# Patient Record
Sex: FEMALE | Race: WHITE | NOT HISPANIC OR LATINO | Employment: FULL TIME
[De-identification: names, ages, dates, MRNs, and addresses within clinical notes are randomized per-mention and may not be internally consistent; named-entity substitution may affect disease eponyms.]

---

## 2017-01-11 ENCOUNTER — LAB SERVICES (OUTPATIENT)
Dept: OTHER | Age: 57
End: 2017-01-11

## 2017-01-11 ENCOUNTER — CHARTING TRANS (OUTPATIENT)
Dept: OTHER | Age: 57
End: 2017-01-11

## 2017-01-13 ENCOUNTER — CHARTING TRANS (OUTPATIENT)
Dept: OTHER | Age: 57
End: 2017-01-13

## 2017-01-13 LAB — PAP WITH HIGH RISK HPV: NORMAL

## 2017-01-26 ENCOUNTER — IMAGING SERVICES (OUTPATIENT)
Dept: OTHER | Age: 57
End: 2017-01-26

## 2017-01-26 ENCOUNTER — HOSPITAL (OUTPATIENT)
Dept: OTHER | Age: 57
End: 2017-01-26
Attending: OBSTETRICS & GYNECOLOGY

## 2017-02-06 ENCOUNTER — OFFICE VISIT (OUTPATIENT)
Dept: DERMATOLOGY CLINIC | Facility: CLINIC | Age: 57
End: 2017-02-06

## 2017-02-06 DIAGNOSIS — D23.5 BENIGN NEOPLASM OF SKIN OF TRUNK, EXCEPT SCROTUM: ICD-10-CM

## 2017-02-06 DIAGNOSIS — L82.1 SEBORRHEIC KERATOSES: ICD-10-CM

## 2017-02-06 DIAGNOSIS — L57.8 SUN-DAMAGED SKIN: ICD-10-CM

## 2017-02-06 DIAGNOSIS — D23.4 BENIGN NEOPLASM OF SCALP AND SKIN OF NECK: ICD-10-CM

## 2017-02-06 DIAGNOSIS — Z85.828 PERSONAL HISTORY OF OTHER MALIGNANT NEOPLASM OF SKIN: Primary | ICD-10-CM

## 2017-02-06 DIAGNOSIS — D23.70 BENIGN NEOPLASM OF SKIN OF LOWER LIMB, INCLUDING HIP, UNSPECIFIED LATERALITY: ICD-10-CM

## 2017-02-06 DIAGNOSIS — D23.30 BENIGN NEOPLASM OF SKIN OF FACE: ICD-10-CM

## 2017-02-06 DIAGNOSIS — L82.0 INFLAMED SEBORRHEIC KERATOSIS: ICD-10-CM

## 2017-02-06 DIAGNOSIS — D23.60 BENIGN NEOPLASM OF SKIN OF UPPER LIMB, INCLUDING SHOULDER, UNSPECIFIED LATERALITY: ICD-10-CM

## 2017-02-06 DIAGNOSIS — L81.4 SOLAR LENTIGO: ICD-10-CM

## 2017-02-06 PROCEDURE — 99213 OFFICE O/P EST LOW 20 MIN: CPT | Performed by: DERMATOLOGY

## 2017-02-06 PROCEDURE — 17110 DESTRUCTION B9 LES UP TO 14: CPT | Performed by: DERMATOLOGY

## 2017-02-06 NOTE — PROGRESS NOTES
Past Medical History   Diagnosis Date   • Basal cell cancer 2004     chest         Past Surgical History    OTHER SURGICAL HISTORY Right 2012    Comment ACL repair       Social History   Marital Status:   Spouse Name: N/A    Years of Education: N/A

## 2017-02-06 NOTE — PROGRESS NOTES
HPI:     Chief Complaint     Lesion        HPI     Lesion    Additional comments: Last visit to derm was 14 months prior. Pt presents today with concern of multiple lesions throughout body and requests a full body skin evaluation.  She does have a hx of BCC Years of Education: N/A  Number of Children: N/A     Occupational History  None on file     Social History Main Topics   Smoking status: Never Smoker     Smokeless tobacco: Not on file    Alcohol Use: Yes    Comment: social    Drug Use: Not on file    S slips–patient will stop all the various lip palms and just use petroleum jelly. If not enough will try 1% hydrocortisone. Solar lentigo-proper use and application of broad-spectrum sunblock SPF 30 or higher discussed.   Patient understands to put it on  1

## 2017-03-14 ENCOUNTER — MYAURORA ACCOUNT LINK (OUTPATIENT)
Dept: OTHER | Age: 57
End: 2017-03-14

## 2017-03-14 ENCOUNTER — PRIOR ORIGINAL RECORDS (OUTPATIENT)
Dept: OTHER | Age: 57
End: 2017-03-14

## 2017-03-15 ENCOUNTER — PRIOR ORIGINAL RECORDS (OUTPATIENT)
Dept: OTHER | Age: 57
End: 2017-03-15

## 2017-04-21 ENCOUNTER — MYAURORA ACCOUNT LINK (OUTPATIENT)
Dept: OTHER | Age: 57
End: 2017-04-21

## 2017-04-21 ENCOUNTER — PRIOR ORIGINAL RECORDS (OUTPATIENT)
Dept: OTHER | Age: 57
End: 2017-04-21

## 2017-04-24 ENCOUNTER — PRIOR ORIGINAL RECORDS (OUTPATIENT)
Dept: OTHER | Age: 57
End: 2017-04-24

## 2017-04-26 ENCOUNTER — PRIOR ORIGINAL RECORDS (OUTPATIENT)
Dept: OTHER | Age: 57
End: 2017-04-26

## 2017-05-03 LAB
ALBUMIN: 4.8 G/DL
ALKALINE PHOSPHATATE(ALK PHOS): 71 IU/L
BILIRUBIN TOTAL: 0.6 MG/DL
BUN: 19 MG/DL
CALCIUM: 9.3 MG/DL
CHLORIDE: 101 MEQ/L
CHOLESTEROL, TOTAL: 207 MG/DL
CREATININE, SERUM: 0.83 MG/DL
FREE T4: 1.15 MG/DL
GLUCOSE: 97 MG/DL
HDL CHOLESTEROL: 103 MG/DL
HEMATOCRIT: 41.7 %
HEMOGLOBIN: 13.8 G/DL
LDL CHOLESTEROL: 92 MG/DL
MAGNESIUM: 2.1 MG/DL
MCH: 31.3 PG
MCHC: 33.1 G/DL
MCV: 95 FL
PLATELETS: 194 K/UL
POTASSIUM, SERUM: 4.2 MEQ/L
PROTEIN, TOTAL: 6.6 G/DL
RED BLOOD COUNT: 4.41 X 10-6/U
SGOT (AST): 14 IU/L
SGPT (ALT): 15 IU/L
SODIUM: 144 MEQ/L
T4(THYROXINE): 4.6 MG/DL
THYROID STIMULATING HORMONE: 2.78 MLU/L
TOTAL CHOLESTEROL / HDL RATIO: 2 RATIO UN
TRIGLYCERIDES: 61 MG/DL
WHITE BLOOD COUNT: 4 X 10-3/U

## 2017-10-23 ENCOUNTER — HOSPITAL ENCOUNTER (OUTPATIENT)
Age: 57
Discharge: HOME | End: 2017-10-23
Attending: FAMILY MEDICINE
Payer: COMMERCIAL

## 2017-10-23 VITALS
HEART RATE: 61 BPM | WEIGHT: 155 LBS | RESPIRATION RATE: 18 BRPM | BODY MASS INDEX: 22.19 KG/M2 | OXYGEN SATURATION: 100 % | HEIGHT: 70 IN | SYSTOLIC BLOOD PRESSURE: 127 MMHG | TEMPERATURE: 98 F | DIASTOLIC BLOOD PRESSURE: 71 MMHG

## 2017-10-23 DIAGNOSIS — H69.83 DYSFUNCTION OF BOTH EUSTACHIAN TUBES: Primary | ICD-10-CM

## 2017-10-23 PROCEDURE — 99212 OFFICE O/P EST SF 10 MIN: CPT

## 2017-10-23 PROCEDURE — 99202 OFFICE O/P NEW SF 15 MIN: CPT

## 2017-10-23 NOTE — ED INITIAL ASSESSMENT (HPI)
PATIENT ARRIVED AMBULATORY TO ROOM C/O A \"CLOGGED FEELING\" TO BILATERAL EARS X1 WEEK. PATIENT STATES \"MY HEAD JUST FEELS CLOGGED\" PATIENT DENIES COUGH. DENIES NASAL CONGESTION. DENIES SINUS PRESSURE. DENIES FEVERS. DENIES N/V/D.  PT ALERT AND ORIENTED X

## 2017-10-23 NOTE — ED PROVIDER NOTES
Patient Seen in: [facility]    History   Patient presents with:  Ear Problem    Stated Complaint: Nakita Perdomo    HPI  Pt is a 63 yo who presents with clogged ears x 1 week. She has some post nasal drip. No fevers.  She place, and time. Skin: Skin is warm. Psychiatric: She has a normal mood and affect. Her behavior is normal.   Nursing note and vitals reviewed.         ED Course   Labs Reviewed - No data to display    ===================================================

## 2018-02-01 ENCOUNTER — CHARTING TRANS (OUTPATIENT)
Dept: OTHER | Age: 58
End: 2018-02-01

## 2018-02-06 ENCOUNTER — OFFICE VISIT (OUTPATIENT)
Dept: DERMATOLOGY CLINIC | Facility: CLINIC | Age: 58
End: 2018-02-06

## 2018-02-06 DIAGNOSIS — D48.5 NEOPLASM OF UNCERTAIN BEHAVIOR OF SKIN: Primary | ICD-10-CM

## 2018-02-06 DIAGNOSIS — D23.60 BENIGN NEOPLASM OF SKIN OF UPPER LIMB, INCLUDING SHOULDER, UNSPECIFIED LATERALITY: ICD-10-CM

## 2018-02-06 DIAGNOSIS — L81.4 SOLAR LENTIGO: ICD-10-CM

## 2018-02-06 DIAGNOSIS — Z85.828 PERSONAL HISTORY OF OTHER MALIGNANT NEOPLASM OF SKIN: ICD-10-CM

## 2018-02-06 DIAGNOSIS — D23.70 BENIGN NEOPLASM OF SKIN OF LOWER LIMB, INCLUDING HIP, UNSPECIFIED LATERALITY: ICD-10-CM

## 2018-02-06 DIAGNOSIS — L57.8 SUN-DAMAGED SKIN: ICD-10-CM

## 2018-02-06 DIAGNOSIS — L82.1 SEBORRHEIC KERATOSES: ICD-10-CM

## 2018-02-06 DIAGNOSIS — D23.30 BENIGN NEOPLASM OF SKIN OF FACE: ICD-10-CM

## 2018-02-06 DIAGNOSIS — D23.5 BENIGN NEOPLASM OF SKIN OF TRUNK, EXCEPT SCROTUM: ICD-10-CM

## 2018-02-06 DIAGNOSIS — D23.4 BENIGN NEOPLASM OF SCALP AND SKIN OF NECK: ICD-10-CM

## 2018-02-06 PROCEDURE — 88342 IMHCHEM/IMCYTCHM 1ST ANTB: CPT | Performed by: DERMATOLOGY

## 2018-02-06 PROCEDURE — 11101 BIOPSY, EACH ADDED LESION: CPT | Performed by: DERMATOLOGY

## 2018-02-06 PROCEDURE — 88305 TISSUE EXAM BY PATHOLOGIST: CPT | Performed by: DERMATOLOGY

## 2018-02-06 PROCEDURE — 11100 BIOPSY OF SKIN LESION: CPT | Performed by: DERMATOLOGY

## 2018-02-06 PROCEDURE — 88341 IMHCHEM/IMCYTCHM EA ADD ANTB: CPT | Performed by: DERMATOLOGY

## 2018-02-06 PROCEDURE — 99212 OFFICE O/P EST SF 10 MIN: CPT | Performed by: DERMATOLOGY

## 2018-02-06 PROCEDURE — 99213 OFFICE O/P EST LOW 20 MIN: CPT | Performed by: DERMATOLOGY

## 2018-02-06 NOTE — PROGRESS NOTES
Past Medical History:   Diagnosis Date   • Basal cell cancer 2004    chest     Past Surgical History:  2012: OTHER SURGICAL HISTORY Right      Comment: ACL repair    Social History  Social History   Marital status:   Spouse name: N/A    Years of ed

## 2018-02-06 NOTE — PROGRESS NOTES
HPI:     Chief Complaint     Skin Cancer        HPI     Skin Cancer    Additional comments: Last visit to derm was 1 yr prior. Pt presents today for f/u due to [facility], and is due for full body skin evaluation. Pt denies a family hx of skin cancer.         Yakelin Broussard No     Social History Narrative   None on file     Family History   Problem Relation Age of Onset   • Heart Disorder Sister    • arotic aneurysm [OTHER] Father        PHYSICAL EXAM:   Physical Exam  A complete body exam was performed, including face, neck, keratoses-reassurance–no treatment  Benign neoplasm of skin of trunk, except scrotum-ABCDE's of melanoma discussed. the patient is to follow up yearly. The patient will come sooner should they note  anything new or changing.   Proper sunblock use  of SPF 3

## 2018-05-01 ENCOUNTER — HOSPITAL ENCOUNTER (OUTPATIENT)
Age: 58
Discharge: HOME | End: 2018-05-01
Payer: COMMERCIAL

## 2018-05-01 VITALS
SYSTOLIC BLOOD PRESSURE: 133 MMHG | OXYGEN SATURATION: 98 % | DIASTOLIC BLOOD PRESSURE: 69 MMHG | BODY MASS INDEX: 21.19 KG/M2 | HEART RATE: 63 BPM | WEIGHT: 148 LBS | TEMPERATURE: 99 F | HEIGHT: 70 IN | RESPIRATION RATE: 18 BRPM

## 2018-05-01 DIAGNOSIS — J01.10 ACUTE NON-RECURRENT FRONTAL SINUSITIS: Primary | ICD-10-CM

## 2018-05-01 PROCEDURE — 99213 OFFICE O/P EST LOW 20 MIN: CPT

## 2018-05-01 PROCEDURE — 99214 OFFICE O/P EST MOD 30 MIN: CPT

## 2018-05-01 PROCEDURE — 87430 STREP A AG IA: CPT

## 2018-05-01 RX ORDER — CEFDINIR 300 MG/1
300 CAPSULE ORAL 2 TIMES DAILY
Qty: 20 CAPSULE | Refills: 0 | Status: SHIPPED | OUTPATIENT
Start: 2018-05-01 | End: 2018-05-11

## 2018-05-01 RX ORDER — BENZONATATE 100 MG/1
200 CAPSULE ORAL 3 TIMES DAILY PRN
Qty: 30 CAPSULE | Refills: 0 | Status: SHIPPED | OUTPATIENT
Start: 2018-05-01 | End: 2019-06-27 | Stop reason: ALTCHOICE

## 2018-05-01 NOTE — ED NOTES
Patient presents with cough, sore throat and sinus pressure and congestion going on 8 days. Low grade fever at home.

## 2018-05-01 NOTE — ED PROVIDER NOTES
Patient presents with:  Sore Throat  Fever (infectious)  Cough/URI      HPI:     Madonna Piedra is a 62year old female who presents with a chief complaint of frontal sinus congestion, thick purulent drainage coming from the nose, frontal sinus discomfort Findings:    /69   Pulse 63   Temp 98.5 °F (36.9 °C) (Oral)   Resp 18   Ht 177.8 cm (5' 10\")   Wt 67.1 kg   SpO2 98%   BMI 21.24 kg/m²   GENERAL: well developed, well nourished, well hydrated, no distress  SKIN: good skin turgor, no obvious rash a visit in 2 days

## 2018-05-01 NOTE — ED INITIAL ASSESSMENT (HPI)
Patient presents with c/o sore throat, cough, nasal congestion and drainage and intermittent fevers for 8 days.

## 2018-10-29 ENCOUNTER — IMAGING SERVICES (OUTPATIENT)
Dept: OTHER | Age: 58
End: 2018-10-29

## 2018-10-29 ENCOUNTER — HOSPITAL (OUTPATIENT)
Dept: OTHER | Age: 58
End: 2018-10-29
Attending: OBSTETRICS & GYNECOLOGY

## 2018-11-01 ENCOUNTER — CHARTING TRANS (OUTPATIENT)
Dept: OTHER | Age: 58
End: 2018-11-01

## 2018-11-02 VITALS
SYSTOLIC BLOOD PRESSURE: 108 MMHG | BODY MASS INDEX: 22.36 KG/M2 | WEIGHT: 151 LBS | HEIGHT: 69 IN | DIASTOLIC BLOOD PRESSURE: 64 MMHG

## 2018-11-05 VITALS
SYSTOLIC BLOOD PRESSURE: 114 MMHG | WEIGHT: 159.79 LBS | BODY MASS INDEX: 22.88 KG/M2 | DIASTOLIC BLOOD PRESSURE: 80 MMHG | HEIGHT: 70 IN | HEART RATE: 72 BPM

## 2019-03-01 VITALS
WEIGHT: 163 LBS | SYSTOLIC BLOOD PRESSURE: 120 MMHG | HEIGHT: 70 IN | DIASTOLIC BLOOD PRESSURE: 80 MMHG | HEART RATE: 66 BPM | BODY MASS INDEX: 23.34 KG/M2

## 2019-04-26 ENCOUNTER — OFFICE VISIT (OUTPATIENT)
Dept: OBGYN | Age: 59
End: 2019-04-26

## 2019-04-26 VITALS
DIASTOLIC BLOOD PRESSURE: 76 MMHG | BODY MASS INDEX: 22.28 KG/M2 | WEIGHT: 150.4 LBS | SYSTOLIC BLOOD PRESSURE: 112 MMHG | HEIGHT: 69 IN

## 2019-04-26 DIAGNOSIS — Z12.31 ENCOUNTER FOR SCREENING MAMMOGRAM FOR MALIGNANT NEOPLASM OF BREAST: ICD-10-CM

## 2019-04-26 DIAGNOSIS — Z01.419 WELL WOMAN EXAM WITH ROUTINE GYNECOLOGICAL EXAM: Primary | ICD-10-CM

## 2019-04-26 DIAGNOSIS — Z78.0 POSTMENOPAUSAL STATUS: ICD-10-CM

## 2019-04-26 PROCEDURE — 99396 PREV VISIT EST AGE 40-64: CPT | Performed by: OBSTETRICS & GYNECOLOGY

## 2019-04-26 RX ORDER — MULTIVITAMIN
TABLET ORAL
COMMUNITY
End: 2020-11-05 | Stop reason: CLARIF

## 2019-04-26 RX ORDER — AMOXICILLIN 500 MG
1200 CAPSULE ORAL
COMMUNITY
Start: 2017-03-14

## 2019-04-26 ASSESSMENT — ENCOUNTER SYMPTOMS
PSYCHIATRIC NEGATIVE: 1
RESPIRATORY NEGATIVE: 1
HEADACHES: 0
CONSTITUTIONAL NEGATIVE: 1

## 2019-06-27 ENCOUNTER — TELEPHONE (OUTPATIENT)
Dept: SCHEDULING | Age: 59
End: 2019-06-27

## 2019-06-27 ENCOUNTER — OFFICE VISIT (OUTPATIENT)
Dept: DERMATOLOGY CLINIC | Facility: CLINIC | Age: 59
End: 2019-06-27
Payer: COMMERCIAL

## 2019-06-27 DIAGNOSIS — L82.1 SEBORRHEIC KERATOSES: ICD-10-CM

## 2019-06-27 DIAGNOSIS — D23.60 BENIGN NEOPLASM OF SKIN OF UPPER LIMB, INCLUDING SHOULDER, UNSPECIFIED LATERALITY: ICD-10-CM

## 2019-06-27 DIAGNOSIS — Z85.828 PERSONAL HISTORY OF SKIN CANCER: Primary | ICD-10-CM

## 2019-06-27 DIAGNOSIS — D23.5 BENIGN NEOPLASM OF SKIN OF TRUNK, EXCEPT SCROTUM: ICD-10-CM

## 2019-06-27 DIAGNOSIS — D23.70 BENIGN NEOPLASM OF SKIN OF LOWER LIMB, INCLUDING HIP, UNSPECIFIED LATERALITY: ICD-10-CM

## 2019-06-27 DIAGNOSIS — D23.30 BENIGN NEOPLASM OF SKIN OF FACE: ICD-10-CM

## 2019-06-27 DIAGNOSIS — L81.4 SOLAR LENTIGO: ICD-10-CM

## 2019-06-27 DIAGNOSIS — D23.4 BENIGN NEOPLASM OF SCALP AND SKIN OF NECK: ICD-10-CM

## 2019-06-27 DIAGNOSIS — L91.8 SKIN TAG: ICD-10-CM

## 2019-06-27 DIAGNOSIS — L57.8 SUN-DAMAGED SKIN: ICD-10-CM

## 2019-06-27 DIAGNOSIS — D18.01 HEMANGIOMA OF SKIN AND SUBCUTANEOUS TISSUE: ICD-10-CM

## 2019-06-27 PROCEDURE — 99213 OFFICE O/P EST LOW 20 MIN: CPT | Performed by: DERMATOLOGY

## 2019-06-27 PROCEDURE — 99212 OFFICE O/P EST SF 10 MIN: CPT | Performed by: DERMATOLOGY

## 2019-06-27 RX ORDER — MELATONIN 100 %
POWDER (GRAM) MISCELLANEOUS
COMMUNITY

## 2019-06-27 RX ORDER — MULTIVITAMIN
TABLET ORAL
COMMUNITY

## 2019-06-27 NOTE — PROGRESS NOTES
HPI:     Chief Complaint     Full Skin Exam        HPI     Full Skin Exam      Additional comments: LOV 2/6/2018. Pt presenting for full body skin exam. Pt has a personal hx of BCC to chest (2004).            Last edited by Kai Fraga LPN on 4/37/558 Procedure Laterality Date   • OTHER SURGICAL HISTORY Right 2012    ACL repair     Social History    Socioeconomic History      Marital status:       Spouse name: Not on file      Number of children: Not on file      Years of education: Not on abilio performed, including face, neck, chest , back, abdomen, r upper extremity, l upper extremity, buttocks, genital area, l lower extremity and right lower extremity. Also exam of scalp    The patient is alert, oriented, and appears their stated age.   Patient treatment  Hemangioma of skin and subcutaneous tissue-reassurance–no treatment    No orders of the defined types were placed in this encounter. Results From Past 48 Hours:  No results found for this or any previous visit (from the past 48 hour(s)).

## 2020-01-17 ENCOUNTER — NURSE ONLY (OUTPATIENT)
Dept: OBGYN | Age: 60
End: 2020-01-17

## 2020-01-17 VITALS — SYSTOLIC BLOOD PRESSURE: 112 MMHG | DIASTOLIC BLOOD PRESSURE: 72 MMHG

## 2020-01-17 DIAGNOSIS — Z23 NEED FOR HPV VACCINATION: Primary | ICD-10-CM

## 2020-01-17 PROCEDURE — 90471 IMMUNIZATION ADMIN: CPT

## 2020-01-17 PROCEDURE — 90651 9VHPV VACCINE 2/3 DOSE IM: CPT

## 2020-02-07 ENCOUNTER — HOSPITAL (OUTPATIENT)
Dept: OTHER | Age: 60
End: 2020-02-07
Attending: OBSTETRICS & GYNECOLOGY

## 2020-03-31 ENCOUNTER — OFFICE VISIT (OUTPATIENT)
Dept: DERMATOLOGY CLINIC | Facility: CLINIC | Age: 60
End: 2020-03-31
Payer: COMMERCIAL

## 2020-03-31 DIAGNOSIS — L57.8 SUN-DAMAGED SKIN: ICD-10-CM

## 2020-03-31 DIAGNOSIS — L81.4 SOLAR LENTIGO: ICD-10-CM

## 2020-03-31 DIAGNOSIS — L82.1 SEBORRHEIC KERATOSES: ICD-10-CM

## 2020-03-31 DIAGNOSIS — D23.30 BENIGN NEOPLASM OF SKIN OF FACE: Primary | ICD-10-CM

## 2020-03-31 DIAGNOSIS — Z85.828 HISTORY OF BASAL CELL CARCINOMA: ICD-10-CM

## 2020-03-31 PROCEDURE — 99213 OFFICE O/P EST LOW 20 MIN: CPT | Performed by: DERMATOLOGY

## 2020-03-31 NOTE — PROGRESS NOTES
HPI:     Chief Complaint     Lesion        HPI     Lesion      Additional comments: LOV 6/27/19. pt presenting today with lesion to R side of temple for 2 weeks. Denies pain or itching . Lesion has changed in size.  pt has HX of [facility]          Last edited by Number of children: Not on file      Years of education: Not on file      Highest education level: Not on file    Occupational History      Not on file    Social Needs      Financial resource strain: Not on file      Food insecurity:        Worry: Not on f suggestive of seborrheic keratosis. 2.  Just lateral to this on the right temple there is a dark bluish-brown 1.5 mm macule. 3.  Lesion noted by the left clavicle is a medium brown stuck on keratotic lesion  4.   There are diffuse blotchy brown macules on

## 2020-05-04 ENCOUNTER — E-ADVICE (OUTPATIENT)
Dept: OBGYN | Age: 60
End: 2020-05-04

## 2020-05-12 ENCOUNTER — TELEPHONE (OUTPATIENT)
Dept: OBGYN | Age: 60
End: 2020-05-12

## 2020-08-06 ENCOUNTER — TELEPHONE (OUTPATIENT)
Dept: OBGYN | Age: 60
End: 2020-08-06

## 2020-08-07 ENCOUNTER — OFFICE VISIT (OUTPATIENT)
Dept: OBGYN | Age: 60
End: 2020-08-07

## 2020-08-07 VITALS
HEIGHT: 69 IN | BODY MASS INDEX: 22.96 KG/M2 | HEART RATE: 80 BPM | SYSTOLIC BLOOD PRESSURE: 130 MMHG | DIASTOLIC BLOOD PRESSURE: 85 MMHG | WEIGHT: 155 LBS

## 2020-08-07 DIAGNOSIS — Z12.4 PAP SMEAR FOR CERVICAL CANCER SCREENING: Primary | ICD-10-CM

## 2020-08-07 DIAGNOSIS — Z13.0 SCREENING FOR DEFICIENCY ANEMIA: ICD-10-CM

## 2020-08-07 DIAGNOSIS — Z13.1 SCREENING FOR DIABETES MELLITUS: ICD-10-CM

## 2020-08-07 DIAGNOSIS — Z11.51 SCREENING FOR HUMAN PAPILLOMAVIRUS (HPV): ICD-10-CM

## 2020-08-07 DIAGNOSIS — R00.2 PALPITATIONS: ICD-10-CM

## 2020-08-07 DIAGNOSIS — Z13.6 SCREENING FOR HEART DISEASE: ICD-10-CM

## 2020-08-07 PROCEDURE — 99396 PREV VISIT EST AGE 40-64: CPT | Performed by: OBSTETRICS & GYNECOLOGY

## 2020-08-07 ASSESSMENT — ENCOUNTER SYMPTOMS
FATIGUE: 1
HEADACHES: 0
GASTROINTESTINAL NEGATIVE: 1
RESPIRATORY NEGATIVE: 1

## 2020-08-17 LAB — HPV16+18+45 E6+E7MRNA CVX NAA+PROBE: NORMAL

## 2020-10-28 ENCOUNTER — WALK IN (OUTPATIENT)
Dept: URGENT CARE | Age: 60
End: 2020-10-28

## 2020-10-28 VITALS
HEART RATE: 72 BPM | BODY MASS INDEX: 23.39 KG/M2 | OXYGEN SATURATION: 99 % | HEIGHT: 69 IN | TEMPERATURE: 97.6 F | WEIGHT: 157.9 LBS | DIASTOLIC BLOOD PRESSURE: 74 MMHG | SYSTOLIC BLOOD PRESSURE: 140 MMHG | RESPIRATION RATE: 20 BRPM

## 2020-10-28 DIAGNOSIS — R42 DIZZINESS, NONSPECIFIC: ICD-10-CM

## 2020-10-28 DIAGNOSIS — R00.2 HEART PALPITATIONS: Primary | ICD-10-CM

## 2020-10-28 DIAGNOSIS — Z83.49 FAMILY HISTORY OF THYROID DISEASE: ICD-10-CM

## 2020-10-28 LAB — GLUCOSE SERPL-MCNC: 101 MG/DL (ref 65–99)

## 2020-10-28 PROCEDURE — 36416 COLLJ CAPILLARY BLOOD SPEC: CPT | Performed by: PHYSICIAN ASSISTANT

## 2020-10-28 PROCEDURE — 93000 ELECTROCARDIOGRAM COMPLETE: CPT | Performed by: INTERNAL MEDICINE

## 2020-10-28 PROCEDURE — 82962 GLUCOSE BLOOD TEST: CPT | Performed by: PHYSICIAN ASSISTANT

## 2020-10-28 PROCEDURE — 99214 OFFICE O/P EST MOD 30 MIN: CPT | Performed by: PHYSICIAN ASSISTANT

## 2020-10-28 ASSESSMENT — ENCOUNTER SYMPTOMS
VOMITING: 0
FEVER: 0
HEADACHES: 0
DIARRHEA: 0
CHILLS: 0
COUGH: 0
SORE THROAT: 0
WEAKNESS: 0
SHORTNESS OF BREATH: 0
WHEEZING: 0
DIZZINESS: 1
ABDOMINAL PAIN: 0
DIAPHORESIS: 0

## 2020-10-29 LAB
ATRIAL RATE (BPM): 56
P AXIS (DEGREES): 50
PR-INTERVAL (MSEC): 132
QRS-INTERVAL (MSEC): 96
QT-INTERVAL (MSEC): 458
QTC: 442
R AXIS (DEGREES): 53
REPORT TEXT: NORMAL
T AXIS (DEGREES): 23
VENTRICULAR RATE EKG/MIN (BPM): 56

## 2020-10-30 ENCOUNTER — LAB SERVICES (OUTPATIENT)
Dept: LAB | Age: 60
End: 2020-10-30

## 2020-10-30 DIAGNOSIS — Z13.6 SCREENING FOR HEART DISEASE: ICD-10-CM

## 2020-10-30 DIAGNOSIS — Z13.0 SCREENING FOR DEFICIENCY ANEMIA: ICD-10-CM

## 2020-10-30 DIAGNOSIS — R00.2 PALPITATIONS: ICD-10-CM

## 2020-10-30 DIAGNOSIS — Z13.1 SCREENING FOR DIABETES MELLITUS: ICD-10-CM

## 2020-10-30 LAB
ALBUMIN SERPL-MCNC: 4.2 G/DL (ref 3.6–5.1)
ALBUMIN/GLOB SERPL: 1.5 {RATIO} (ref 1–2.4)
ALP SERPL-CCNC: 68 UNITS/L (ref 45–117)
ALT SERPL-CCNC: 26 UNITS/L
ANION GAP SERPL CALC-SCNC: 13 MMOL/L (ref 10–20)
AST SERPL-CCNC: 19 UNITS/L
BILIRUB SERPL-MCNC: 0.6 MG/DL (ref 0.2–1)
BUN SERPL-MCNC: 21 MG/DL (ref 6–20)
BUN/CREAT SERPL: 25 (ref 7–25)
CALCIUM SERPL-MCNC: 9.2 MG/DL (ref 8.4–10.2)
CHLORIDE SERPL-SCNC: 103 MMOL/L (ref 98–107)
CHOLEST SERPL-MCNC: 215 MG/DL
CHOLEST/HDLC SERPL: 2.4 {RATIO}
CO2 SERPL-SCNC: 29 MMOL/L (ref 21–32)
CREAT SERPL-MCNC: 0.85 MG/DL (ref 0.51–0.95)
DEPRECATED RDW RBC: 41 FL (ref 39–50)
ERYTHROCYTE [DISTWIDTH] IN BLOOD: 12 % (ref 11–15)
FASTING DURATION TIME PATIENT: 12 HOURS
FASTING DURATION TIME PATIENT: 12 HOURS
GFR SERPLBLD BASED ON 1.73 SQ M-ARVRAT: 75 ML/MIN/1.73M2
GLOBULIN SER-MCNC: 2.8 G/DL (ref 2–4)
GLUCOSE SERPL-MCNC: 98 MG/DL (ref 65–99)
HCT VFR BLD CALC: 41.6 % (ref 36–46.5)
HDLC SERPL-MCNC: 91 MG/DL
HGB BLD-MCNC: 14.1 G/DL (ref 12–15.5)
LDLC SERPL CALC-MCNC: 105 MG/DL
MCH RBC QN AUTO: 32.4 PG (ref 26–34)
MCHC RBC AUTO-ENTMCNC: 33.9 G/DL (ref 32–36.5)
MCV RBC AUTO: 95.6 FL (ref 78–100)
NONHDLC SERPL-MCNC: 124 MG/DL
PLATELET # BLD AUTO: 171 K/MCL (ref 140–450)
POTASSIUM SERPL-SCNC: 3.9 MMOL/L (ref 3.4–5.1)
PROT SERPL-MCNC: 7 G/DL (ref 6.4–8.2)
RBC # BLD: 4.35 MIL/MCL (ref 4–5.2)
SODIUM SERPL-SCNC: 141 MMOL/L (ref 135–145)
TRIGL SERPL-MCNC: 96 MG/DL
TSH SERPL-ACNC: 1.98 MCUNITS/ML (ref 0.35–5)
WBC # BLD: 4.1 K/MCL (ref 4.2–11)

## 2020-10-30 PROCEDURE — 80050 GENERAL HEALTH PANEL: CPT | Performed by: INTERNAL MEDICINE

## 2020-10-30 PROCEDURE — 80061 LIPID PANEL: CPT | Performed by: INTERNAL MEDICINE

## 2020-10-30 PROCEDURE — 36415 COLL VENOUS BLD VENIPUNCTURE: CPT | Performed by: INTERNAL MEDICINE

## 2020-11-02 PROBLEM — R00.2 PALPITATIONS: Status: ACTIVE | Noted: 2020-11-02

## 2020-11-02 PROBLEM — H81.10 BENIGN PAROXYSMAL POSITIONAL VERTIGO, UNSPECIFIED LATERALITY: Status: ACTIVE | Noted: 2020-11-02

## 2020-11-05 ENCOUNTER — OFFICE VISIT (OUTPATIENT)
Dept: CARDIOLOGY | Age: 60
End: 2020-11-05

## 2020-11-05 ENCOUNTER — ANCILLARY PROCEDURE (OUTPATIENT)
Dept: CARDIOLOGY | Age: 60
End: 2020-11-05
Attending: INTERNAL MEDICINE

## 2020-11-05 VITALS
WEIGHT: 156 LBS | HEART RATE: 62 BPM | HEIGHT: 69 IN | DIASTOLIC BLOOD PRESSURE: 86 MMHG | RESPIRATION RATE: 16 BRPM | BODY MASS INDEX: 23.11 KG/M2 | SYSTOLIC BLOOD PRESSURE: 128 MMHG

## 2020-11-05 DIAGNOSIS — R00.2 PALPITATIONS: Primary | ICD-10-CM

## 2020-11-05 DIAGNOSIS — Z82.49 FAMILY HISTORY OF EARLY CAD: ICD-10-CM

## 2020-11-05 DIAGNOSIS — R00.2 PALPITATIONS: ICD-10-CM

## 2020-11-05 DIAGNOSIS — E78.41 ELEVATED LP(A): ICD-10-CM

## 2020-11-05 PROCEDURE — 99204 OFFICE O/P NEW MOD 45 MIN: CPT | Performed by: INTERNAL MEDICINE

## 2020-11-05 ASSESSMENT — ENCOUNTER SYMPTOMS
HEMOPTYSIS: 0
BRUISES/BLEEDS EASILY: 0
ENDOCRINE NEGATIVE: 1
COUGH: 0
HEMATOCHEZIA: 0
SHORTNESS OF BREATH: 0
CHILLS: 0
SYNCOPE: 0
WEIGHT LOSS: 0
DIZZINESS: 0
LIGHT-HEADEDNESS: 0
FEVER: 0
WEIGHT GAIN: 0
SUSPICIOUS LESIONS: 0
ABDOMINAL PAIN: 0
PSYCHIATRIC NEGATIVE: 1

## 2020-11-05 ASSESSMENT — PATIENT HEALTH QUESTIONNAIRE - PHQ9
SUM OF ALL RESPONSES TO PHQ9 QUESTIONS 1 AND 2: 0
1. LITTLE INTEREST OR PLEASURE IN DOING THINGS: NOT AT ALL
CLINICAL INTERPRETATION OF PHQ2 SCORE: NO FURTHER SCREENING NEEDED
SUM OF ALL RESPONSES TO PHQ9 QUESTIONS 1 AND 2: 0
CLINICAL INTERPRETATION OF PHQ9 SCORE: NO FURTHER SCREENING NEEDED
2. FEELING DOWN, DEPRESSED OR HOPELESS: NOT AT ALL

## 2020-11-18 ENCOUNTER — ANCILLARY PROCEDURE (OUTPATIENT)
Dept: CARDIOLOGY | Age: 60
End: 2020-11-18
Attending: INTERNAL MEDICINE

## 2020-11-18 ENCOUNTER — TELEPHONE (OUTPATIENT)
Dept: CARDIOLOGY | Age: 60
End: 2020-11-18

## 2020-11-18 DIAGNOSIS — E78.41 ELEVATED LP(A): ICD-10-CM

## 2020-11-18 DIAGNOSIS — R00.2 PALPITATIONS: ICD-10-CM

## 2020-11-18 DIAGNOSIS — Z82.49 FAMILY HISTORY OF EARLY CAD: ICD-10-CM

## 2020-11-18 PROCEDURE — 93306 TTE W/DOPPLER COMPLETE: CPT | Performed by: INTERNAL MEDICINE

## 2020-11-19 ENCOUNTER — TELEPHONE (OUTPATIENT)
Dept: OBGYN | Age: 60
End: 2020-11-19

## 2020-12-01 PROCEDURE — 93268 ECG RECORD/REVIEW: CPT | Performed by: INTERNAL MEDICINE

## 2020-12-08 ENCOUNTER — OFFICE VISIT (OUTPATIENT)
Dept: DERMATOLOGY CLINIC | Facility: CLINIC | Age: 60
End: 2020-12-08
Payer: COMMERCIAL

## 2020-12-08 DIAGNOSIS — L81.4 SOLAR LENTIGO: ICD-10-CM

## 2020-12-08 DIAGNOSIS — D23.70 BENIGN NEOPLASM OF SKIN OF LOWER LIMB, INCLUDING HIP, UNSPECIFIED LATERALITY: ICD-10-CM

## 2020-12-08 DIAGNOSIS — Z85.828 HISTORY OF BASAL CELL CARCINOMA: ICD-10-CM

## 2020-12-08 DIAGNOSIS — R20.2 NOTALGIA PARESTHETICA: ICD-10-CM

## 2020-12-08 DIAGNOSIS — D23.5 BENIGN NEOPLASM OF SKIN OF TRUNK, EXCEPT SCROTUM: ICD-10-CM

## 2020-12-08 DIAGNOSIS — D18.01 HEMANGIOMA OF SKIN AND SUBCUTANEOUS TISSUE: ICD-10-CM

## 2020-12-08 DIAGNOSIS — L82.1 SEBORRHEIC KERATOSES: ICD-10-CM

## 2020-12-08 DIAGNOSIS — D23.60 BENIGN NEOPLASM OF SKIN OF UPPER LIMB, INCLUDING SHOULDER, UNSPECIFIED LATERALITY: ICD-10-CM

## 2020-12-08 DIAGNOSIS — D23.30 BENIGN NEOPLASM OF SKIN OF FACE: ICD-10-CM

## 2020-12-08 DIAGNOSIS — L90.5 SCAR CONDITION AND FIBROSIS OF SKIN: ICD-10-CM

## 2020-12-08 DIAGNOSIS — L91.8 SKIN TAG: ICD-10-CM

## 2020-12-08 DIAGNOSIS — D23.4 BENIGN NEOPLASM OF SCALP AND SKIN OF NECK: ICD-10-CM

## 2020-12-08 DIAGNOSIS — L57.8 SUN-DAMAGED SKIN: Primary | ICD-10-CM

## 2020-12-08 PROCEDURE — 99213 OFFICE O/P EST LOW 20 MIN: CPT | Performed by: DERMATOLOGY

## 2020-12-08 NOTE — PROGRESS NOTES
HPI:     Chief Complaint     Full Skin Exam        HPI     Full Skin Exam      Additional comments: LOV 3/31/20. pt presenting today with full body skin check.  pt has HX of [address] [employer]          Last edited by JARAD Mcgovern on 12/8/2020  3:08 PM. (History) Past Surgical History:   Procedure Laterality Date   • OTHER SURGICAL HISTORY Right 2012    ACL repair     Social History    Socioeconomic History      Marital status:       Spouse name: Not on file      Number of children: Not on file      Tash complete body exam was performed, including face, neck, chest , back, abdomen, r upper extremity, l upper extremity, buttocks, genital area, l lower extremity and right lower extremity, and scalp    The patient is alert, oriented, and appears their stated new or changing.   Proper sunblock use  of SPF 30 or higher, hats, discussed  Benign neoplasm of skin of upper limb, including shoulder, unspecified laterality  Benign neoplasm of skin of lower limb, including hip, unspecified laterality  Hemangioma of skin

## 2021-01-07 ENCOUNTER — TELEPHONE (OUTPATIENT)
Dept: CARDIOLOGY | Age: 61
End: 2021-01-07

## 2021-01-07 RX ORDER — METOPROLOL SUCCINATE 25 MG/1
25 TABLET, EXTENDED RELEASE ORAL AT BEDTIME
Qty: 90 TABLET | Refills: 3 | Status: SHIPPED | OUTPATIENT
Start: 2021-01-07 | End: 2021-04-14 | Stop reason: DRUGHIGH

## 2021-01-07 RX ORDER — METOPROLOL SUCCINATE 25 MG/1
25 TABLET, EXTENDED RELEASE ORAL AT BEDTIME
COMMUNITY
End: 2021-01-07 | Stop reason: SDUPTHER

## 2021-03-31 DIAGNOSIS — Z83.49 FAMILY HISTORY OF ENDOCRINE AND METABOLIC DISEASE: Primary | ICD-10-CM

## 2021-04-01 ENCOUNTER — LAB SERVICES (OUTPATIENT)
Dept: LAB | Age: 61
End: 2021-04-01

## 2021-04-01 DIAGNOSIS — Z83.49 FAMILY HISTORY OF THYROID DISEASE: ICD-10-CM

## 2021-04-01 DIAGNOSIS — Z83.49 FAMILY HISTORY OF THYROID DISEASE: Primary | ICD-10-CM

## 2021-04-01 PROCEDURE — 84443 ASSAY THYROID STIM HORMONE: CPT | Performed by: CLINICAL MEDICAL LABORATORY

## 2021-04-01 PROCEDURE — 36415 COLL VENOUS BLD VENIPUNCTURE: CPT | Performed by: CLINICAL MEDICAL LABORATORY

## 2021-04-02 LAB — TSH SERPL-ACNC: 1.17 MCUNITS/ML (ref 0.35–5)

## 2021-04-14 ENCOUNTER — OFFICE VISIT (OUTPATIENT)
Dept: CARDIOLOGY | Age: 61
End: 2021-04-14

## 2021-04-14 VITALS
SYSTOLIC BLOOD PRESSURE: 142 MMHG | BODY MASS INDEX: 22.96 KG/M2 | HEART RATE: 68 BPM | DIASTOLIC BLOOD PRESSURE: 90 MMHG | WEIGHT: 155 LBS | HEIGHT: 69 IN

## 2021-04-14 DIAGNOSIS — Z01.812 PRE-PROCEDURAL LABORATORY EXAMINATION: Primary | ICD-10-CM

## 2021-04-14 DIAGNOSIS — R00.2 PALPITATIONS: Primary | ICD-10-CM

## 2021-04-14 DIAGNOSIS — I49.3 PVC (PREMATURE VENTRICULAR CONTRACTION): ICD-10-CM

## 2021-04-14 DIAGNOSIS — I34.1 MVP (MITRAL VALVE PROLAPSE): ICD-10-CM

## 2021-04-14 PROCEDURE — 99214 OFFICE O/P EST MOD 30 MIN: CPT | Performed by: INTERNAL MEDICINE

## 2021-04-14 RX ORDER — METOPROLOL SUCCINATE 25 MG/1
25 TABLET, EXTENDED RELEASE ORAL 2 TIMES DAILY
Qty: 60 TABLET | Refills: 1 | Status: SHIPPED | OUTPATIENT
Start: 2021-04-14 | End: 2021-06-14 | Stop reason: SDUPTHER

## 2021-04-14 RX ORDER — METOPROLOL SUCCINATE 25 MG/1
25 TABLET, EXTENDED RELEASE ORAL 2 TIMES DAILY
COMMUNITY
End: 2021-04-14 | Stop reason: SDUPTHER

## 2021-04-14 RX ORDER — METOPROLOL SUCCINATE 25 MG/1
25 TABLET, EXTENDED RELEASE ORAL 2 TIMES DAILY
Qty: 60 TABLET | Refills: 1 | OUTPATIENT
Start: 2021-04-14

## 2021-04-14 SDOH — HEALTH STABILITY: PHYSICAL HEALTH: ON AVERAGE, HOW MANY MINUTES DO YOU ENGAGE IN EXERCISE AT THIS LEVEL?: 20 MIN

## 2021-04-14 SDOH — HEALTH STABILITY: PHYSICAL HEALTH: ON AVERAGE, HOW MANY DAYS PER WEEK DO YOU ENGAGE IN MODERATE TO STRENUOUS EXERCISE (LIKE A BRISK WALK)?: 3 DAYS

## 2021-04-14 ASSESSMENT — PATIENT HEALTH QUESTIONNAIRE - PHQ9
2. FEELING DOWN, DEPRESSED OR HOPELESS: NOT AT ALL
SUM OF ALL RESPONSES TO PHQ9 QUESTIONS 1 AND 2: 0
CLINICAL INTERPRETATION OF PHQ9 SCORE: NO FURTHER SCREENING NEEDED
1. LITTLE INTEREST OR PLEASURE IN DOING THINGS: NOT AT ALL
SUM OF ALL RESPONSES TO PHQ9 QUESTIONS 1 AND 2: 0
CLINICAL INTERPRETATION OF PHQ2 SCORE: NO FURTHER SCREENING NEEDED

## 2021-05-16 ENCOUNTER — APPOINTMENT (OUTPATIENT)
Dept: LAB | Age: 61
End: 2021-05-16

## 2021-06-16 RX ORDER — METOPROLOL SUCCINATE 25 MG/1
25 TABLET, EXTENDED RELEASE ORAL 2 TIMES DAILY
Qty: 180 TABLET | Refills: 2 | Status: SHIPPED | OUTPATIENT
Start: 2021-06-16 | End: 2022-03-21 | Stop reason: SDUPTHER

## 2021-06-20 ENCOUNTER — APPOINTMENT (OUTPATIENT)
Dept: LAB | Age: 61
End: 2021-06-20

## 2021-06-22 ENCOUNTER — ANCILLARY PROCEDURE (OUTPATIENT)
Dept: CARDIOLOGY | Age: 61
End: 2021-06-22
Attending: INTERNAL MEDICINE

## 2021-06-22 DIAGNOSIS — I34.1 MVP (MITRAL VALVE PROLAPSE): ICD-10-CM

## 2021-06-22 DIAGNOSIS — I49.3 PVC (PREMATURE VENTRICULAR CONTRACTION): ICD-10-CM

## 2021-06-22 DIAGNOSIS — R00.2 PALPITATIONS: ICD-10-CM

## 2021-06-22 PROCEDURE — 93351 STRESS TTE COMPLETE: CPT | Performed by: INTERNAL MEDICINE

## 2021-06-23 ENCOUNTER — TELEPHONE (OUTPATIENT)
Dept: CARDIOLOGY | Age: 61
End: 2021-06-23

## 2021-11-11 DIAGNOSIS — Z11.59 SCREENING EXAMINATION FOR OTHER ARTHROPOD-BORNE VIRAL DISEASES: Primary | ICD-10-CM

## 2021-11-16 ENCOUNTER — LAB SERVICES (OUTPATIENT)
Dept: LAB | Age: 61
End: 2021-11-16

## 2021-11-16 DIAGNOSIS — Z11.59 SCREENING EXAMINATION FOR OTHER ARTHROPOD-BORNE VIRAL DISEASES: ICD-10-CM

## 2021-11-16 LAB
SARS-COV-2 RNA RESP QL NAA+PROBE: NOT DETECTED
SERVICE CMNT-IMP: NORMAL
SERVICE CMNT-IMP: NORMAL

## 2021-11-16 PROCEDURE — U0003 INFECTIOUS AGENT DETECTION BY NUCLEIC ACID (DNA OR RNA); SEVERE ACUTE RESPIRATORY SYNDROME CORONAVIRUS 2 (SARS-COV-2) (CORONAVIRUS DISEASE [COVID-19]), AMPLIFIED PROBE TECHNIQUE, MAKING USE OF HIGH THROUGHPUT TECHNOLOGIES AS DESCRIBED BY CMS-2020-01-R: HCPCS | Performed by: PSYCHIATRY & NEUROLOGY

## 2021-11-16 PROCEDURE — U0005 INFEC AGEN DETEC AMPLI PROBE: HCPCS | Performed by: PSYCHIATRY & NEUROLOGY

## 2021-12-09 ENCOUNTER — OFFICE VISIT (OUTPATIENT)
Dept: OBGYN | Age: 61
End: 2021-12-09

## 2021-12-09 VITALS
TEMPERATURE: 98.1 F | HEIGHT: 70 IN | WEIGHT: 148.92 LBS | DIASTOLIC BLOOD PRESSURE: 77 MMHG | HEART RATE: 62 BPM | SYSTOLIC BLOOD PRESSURE: 139 MMHG | BODY MASS INDEX: 21.32 KG/M2

## 2021-12-09 DIAGNOSIS — R92.2 DENSE BREAST: ICD-10-CM

## 2021-12-09 DIAGNOSIS — Z12.31 ENCOUNTER FOR SCREENING MAMMOGRAM FOR MALIGNANT NEOPLASM OF BREAST: ICD-10-CM

## 2021-12-09 DIAGNOSIS — Z12.39 ENCOUNTER FOR BREAST CANCER SCREENING OTHER THAN MAMMOGRAM: ICD-10-CM

## 2021-12-09 DIAGNOSIS — R87.620 PAPANICOLAOU SMEAR OF VAGINA WITH ATYPICAL SQUAMOUS CELLS OF UNDETERMINED SIGNIFICANCE (ASC-US): Primary | ICD-10-CM

## 2021-12-09 DIAGNOSIS — N95.9 MENOPAUSAL AND POSTMENOPAUSAL DISORDER: ICD-10-CM

## 2021-12-09 DIAGNOSIS — R92.30 DENSE BREAST: ICD-10-CM

## 2021-12-09 DIAGNOSIS — Z12.39 OTHER SCREENING BREAST EXAMINATION: ICD-10-CM

## 2021-12-09 PROCEDURE — 99396 PREV VISIT EST AGE 40-64: CPT | Performed by: OBSTETRICS & GYNECOLOGY

## 2021-12-09 PROCEDURE — 88175 CYTOPATH C/V AUTO FLUID REDO: CPT | Performed by: CLINICAL MEDICAL LABORATORY

## 2021-12-09 PROCEDURE — 87624 HPV HI-RISK TYP POOLED RSLT: CPT | Performed by: CLINICAL MEDICAL LABORATORY

## 2021-12-09 ASSESSMENT — ENCOUNTER SYMPTOMS
NEUROLOGICAL NEGATIVE: 1
RESPIRATORY NEGATIVE: 1
NERVOUS/ANXIOUS: 1
ROS SKIN COMMENTS: HAIR LOSS
CONSTITUTIONAL NEGATIVE: 1
ROS GI COMMENTS: COLON POLYPS

## 2021-12-14 ENCOUNTER — HOSPITAL ENCOUNTER (OUTPATIENT)
Dept: GENERAL RADIOLOGY | Age: 61
Discharge: HOME | End: 2021-12-14
Attending: OBSTETRICS & GYNECOLOGY

## 2021-12-14 DIAGNOSIS — N95.9 MENOPAUSAL AND POSTMENOPAUSAL DISORDER: ICD-10-CM

## 2021-12-14 PROCEDURE — 77080 DXA BONE DENSITY AXIAL: CPT

## 2021-12-15 LAB
CASE RPRT: NORMAL
CLINICAL INFO: NORMAL
CYTOLOGY CVX/VAG STUDY: NORMAL
CYTOLOGY CVX/VAG STUDY: NORMAL
HPV16+18+45 E6+E7MRNA CVX NAA+PROBE: NEGATIVE
Lab: NORMAL
PAP EDUCATIONAL NOTE: NORMAL
SPECIMEN ADEQUACY: NORMAL

## 2021-12-20 ENCOUNTER — OFFICE VISIT (OUTPATIENT)
Dept: DERMATOLOGY CLINIC | Facility: CLINIC | Age: 61
End: 2021-12-20
Payer: COMMERCIAL

## 2021-12-20 DIAGNOSIS — D23.60 BENIGN NEOPLASM OF SKIN OF UPPER LIMB, INCLUDING SHOULDER, UNSPECIFIED LATERALITY: ICD-10-CM

## 2021-12-20 DIAGNOSIS — L81.4 LENTIGO: Primary | ICD-10-CM

## 2021-12-20 DIAGNOSIS — D23.5 BENIGN NEOPLASM OF SKIN OF TRUNK, EXCEPT SCROTUM: ICD-10-CM

## 2021-12-20 DIAGNOSIS — L57.8 SUN-DAMAGED SKIN: ICD-10-CM

## 2021-12-20 DIAGNOSIS — D23.70 BENIGN NEOPLASM OF SKIN OF LOWER LIMB, INCLUDING HIP, UNSPECIFIED LATERALITY: ICD-10-CM

## 2021-12-20 DIAGNOSIS — L82.1 SEBORRHEIC KERATOSES: ICD-10-CM

## 2021-12-20 DIAGNOSIS — R20.2 NOTALGIA PARESTHETICA: ICD-10-CM

## 2021-12-20 DIAGNOSIS — D23.4 BENIGN NEOPLASM OF SCALP AND SKIN OF NECK: ICD-10-CM

## 2021-12-20 DIAGNOSIS — D23.30 BENIGN NEOPLASM OF SKIN OF FACE: ICD-10-CM

## 2021-12-20 DIAGNOSIS — Z85.828 HISTORY OF BASAL CELL CARCINOMA: ICD-10-CM

## 2021-12-20 PROCEDURE — 99213 OFFICE O/P EST LOW 20 MIN: CPT | Performed by: DERMATOLOGY

## 2021-12-23 ENCOUNTER — HOSPITAL ENCOUNTER (OUTPATIENT)
Dept: MAMMOGRAPHY | Age: 61
Discharge: HOME | End: 2021-12-23
Attending: OBSTETRICS & GYNECOLOGY

## 2021-12-23 ENCOUNTER — HOSPITAL ENCOUNTER (OUTPATIENT)
Dept: ULTRASOUND IMAGING | Age: 61
Discharge: HOME | End: 2021-12-23
Attending: OBSTETRICS & GYNECOLOGY

## 2021-12-23 DIAGNOSIS — R92.2 DENSE BREAST: ICD-10-CM

## 2021-12-23 DIAGNOSIS — Z12.39 ENCOUNTER FOR BREAST CANCER SCREENING OTHER THAN MAMMOGRAM: ICD-10-CM

## 2021-12-23 DIAGNOSIS — R92.30 DENSE BREAST: ICD-10-CM

## 2021-12-23 DIAGNOSIS — Z12.31 ENCOUNTER FOR SCREENING MAMMOGRAM FOR MALIGNANT NEOPLASM OF BREAST: ICD-10-CM

## 2021-12-23 PROCEDURE — 77063 BREAST TOMOSYNTHESIS BI: CPT

## 2021-12-23 PROCEDURE — 76641 ULTRASOUND BREAST COMPLETE: CPT

## 2022-01-03 NOTE — PROGRESS NOTES
Mark Anthony Chong is a 64year old female. HPI:     CC:  Patient presents with:  Full Skin Exam: former pt of [facility], presents for annual skin exam, personal hx of BCC, please check lesion to right neck that \"may be getting bigger\".  No other skin cocnerns Smokeless tobacco: Never Used    Substance and Sexual Activity      Alcohol use: Yes        Comment: social      Drug use: Not on file      Sexual activity: Not on file    Other Topics      Concerns:        [place] farm: Not Asked        History of ta eyes, including conjunctival mucosa, eyelids, lips external ears, back, chest,/ breasts, axillae,  abdomen, arms, legs, palms.      Multiple light to medium brown, well marginated, uniformly pigmented, macules and papules 6 mm and less scattered on exam. pi keratoses scattered  Waxy tan keratotic papules lesions in areas of concern as noted reassurance given. Benign nature discussed. Possibility of cryo, alphahydroxy acids over-the-counter retinol's discussed.     Area of itching at upper central left back c

## 2022-01-12 PROBLEM — I49.3 PVC'S (PREMATURE VENTRICULAR CONTRACTIONS): Status: ACTIVE | Noted: 2022-01-12

## 2022-01-12 PROBLEM — F41.8 SITUATIONAL ANXIETY: Status: ACTIVE | Noted: 2022-01-12

## 2022-01-12 PROBLEM — I34.1 MITRAL VALVE PROLAPSE: Status: ACTIVE | Noted: 2022-01-12

## 2022-03-21 RX ORDER — METOPROLOL SUCCINATE 25 MG/1
25 TABLET, EXTENDED RELEASE ORAL 2 TIMES DAILY
Qty: 60 TABLET | Refills: 0 | Status: SHIPPED | OUTPATIENT
Start: 2022-03-21 | End: 2022-04-18

## 2022-03-21 RX ORDER — METOPROLOL SUCCINATE 25 MG/1
TABLET, EXTENDED RELEASE ORAL
Qty: 180 TABLET | OUTPATIENT
Start: 2022-03-21

## 2022-04-11 ENCOUNTER — OFFICE VISIT (OUTPATIENT)
Dept: DERMATOLOGY CLINIC | Facility: CLINIC | Age: 62
End: 2022-04-11
Payer: COMMERCIAL

## 2022-04-11 DIAGNOSIS — D23.9 BENIGN NEOPLASM OF SKIN, UNSPECIFIED LOCATION: ICD-10-CM

## 2022-04-11 DIAGNOSIS — D22.9 MULTIPLE NEVI: ICD-10-CM

## 2022-04-11 DIAGNOSIS — Z85.828 HISTORY OF BASAL CELL CARCINOMA: ICD-10-CM

## 2022-04-11 DIAGNOSIS — L82.1 SEBORRHEIC KERATOSES: ICD-10-CM

## 2022-04-11 DIAGNOSIS — R20.2 NOTALGIA PARESTHETICA: ICD-10-CM

## 2022-04-11 DIAGNOSIS — L82.1 VERRUCOUS KERATOSIS: Primary | ICD-10-CM

## 2022-04-11 DIAGNOSIS — L57.8 SUN-DAMAGED SKIN: ICD-10-CM

## 2022-04-11 DIAGNOSIS — L81.4 SOLAR LENTIGO: ICD-10-CM

## 2022-04-11 PROCEDURE — 17110 DESTRUCTION B9 LES UP TO 14: CPT | Performed by: DERMATOLOGY

## 2022-04-11 PROCEDURE — 99213 OFFICE O/P EST LOW 20 MIN: CPT | Performed by: DERMATOLOGY

## 2022-04-18 RX ORDER — METOPROLOL SUCCINATE 25 MG/1
TABLET, EXTENDED RELEASE ORAL
Qty: 30 TABLET | Refills: 0 | Status: SHIPPED | OUTPATIENT
Start: 2022-04-18 | End: 2022-04-28

## 2022-04-20 ENCOUNTER — OFFICE VISIT (OUTPATIENT)
Dept: CARDIOLOGY | Age: 62
End: 2022-04-20

## 2022-04-20 VITALS
WEIGHT: 153 LBS | BODY MASS INDEX: 21.9 KG/M2 | DIASTOLIC BLOOD PRESSURE: 76 MMHG | SYSTOLIC BLOOD PRESSURE: 130 MMHG | HEART RATE: 68 BPM | HEIGHT: 70 IN

## 2022-04-20 DIAGNOSIS — R00.2 PALPITATIONS: ICD-10-CM

## 2022-04-20 DIAGNOSIS — Z82.49 FAMILY HISTORY OF EARLY CAD: ICD-10-CM

## 2022-04-20 DIAGNOSIS — I34.1 MVP (MITRAL VALVE PROLAPSE): ICD-10-CM

## 2022-04-20 DIAGNOSIS — I49.3 PVC (PREMATURE VENTRICULAR CONTRACTION): Primary | ICD-10-CM

## 2022-04-20 DIAGNOSIS — E78.41 ELEVATED LP(A): ICD-10-CM

## 2022-04-20 PROCEDURE — 99214 OFFICE O/P EST MOD 30 MIN: CPT | Performed by: INTERNAL MEDICINE

## 2022-04-20 RX ORDER — LORAZEPAM 0.5 MG/1
0.5 TABLET ORAL
COMMUNITY
Start: 2022-01-12 | End: 2023-03-16 | Stop reason: ALTCHOICE

## 2022-04-20 SDOH — HEALTH STABILITY: PHYSICAL HEALTH: ON AVERAGE, HOW MANY MINUTES DO YOU ENGAGE IN EXERCISE AT THIS LEVEL?: 60 MIN

## 2022-04-20 SDOH — HEALTH STABILITY: PHYSICAL HEALTH: ON AVERAGE, HOW MANY DAYS PER WEEK DO YOU ENGAGE IN MODERATE TO STRENUOUS EXERCISE (LIKE A BRISK WALK)?: 4 DAYS

## 2022-04-20 ASSESSMENT — PATIENT HEALTH QUESTIONNAIRE - PHQ9
SUM OF ALL RESPONSES TO PHQ9 QUESTIONS 1 AND 2: 0
1. LITTLE INTEREST OR PLEASURE IN DOING THINGS: NOT AT ALL
SUM OF ALL RESPONSES TO PHQ9 QUESTIONS 1 AND 2: 0
CLINICAL INTERPRETATION OF PHQ2 SCORE: NO FURTHER SCREENING NEEDED
2. FEELING DOWN, DEPRESSED OR HOPELESS: NOT AT ALL

## 2022-04-28 RX ORDER — METOPROLOL SUCCINATE 25 MG/1
TABLET, EXTENDED RELEASE ORAL
Qty: 180 TABLET | Refills: 1 | Status: SHIPPED | OUTPATIENT
Start: 2022-04-28 | End: 2022-05-19 | Stop reason: SDUPTHER

## 2022-05-17 ENCOUNTER — ANCILLARY PROCEDURE (OUTPATIENT)
Dept: CARDIOLOGY | Age: 62
End: 2022-05-17
Attending: INTERNAL MEDICINE

## 2022-05-17 DIAGNOSIS — I34.1 MVP (MITRAL VALVE PROLAPSE): ICD-10-CM

## 2022-05-17 DIAGNOSIS — R00.2 PALPITATIONS: ICD-10-CM

## 2022-05-17 DIAGNOSIS — E78.41 ELEVATED LP(A): ICD-10-CM

## 2022-05-17 DIAGNOSIS — Z82.49 FAMILY HISTORY OF EARLY CAD: ICD-10-CM

## 2022-05-17 DIAGNOSIS — I49.3 PVC (PREMATURE VENTRICULAR CONTRACTION): ICD-10-CM

## 2022-05-17 LAB
AORTIC VALVE AREA: NORMAL
ASCENDING AORTA (AAD): 3.4
AV MEAN GRADIENT (AVMG): NORMAL
AV MEAN VELOCITY (AVMV): NORMAL
AV PEAK GRADIENT (AVPG): 6
AV PEAK VELOCITY (AVPV): 1.27
AV STENOSIS SEVERITY TEXT: NORMAL
DOP CALC LVOT PEAK VEL (LVOTPV): 0.82
E WAVE DECELARATION TIME (MDT): 381
EST RIGHT VENT SYSTOLIC PRESSURE BY TRICUSPID REGURGITATION JET (RVSP): 29
LEFT INTERNAL DIMENSION IN SYSTOLE (LVSD): 3.5
LEFT VENTRICULAR INTERNAL DIMENSION IN DIASTOLE (LVDD): 5.5
LV EF: NORMAL %
LVOT 2D (LVOTD): 2

## 2022-05-17 PROCEDURE — 93306 TTE W/DOPPLER COMPLETE: CPT | Performed by: INTERNAL MEDICINE

## 2022-05-17 PROCEDURE — 76376 3D RENDER W/INTRP POSTPROCES: CPT | Performed by: INTERNAL MEDICINE

## 2022-05-17 PROCEDURE — 93880 EXTRACRANIAL BILAT STUDY: CPT | Performed by: INTERNAL MEDICINE

## 2022-05-18 ENCOUNTER — TELEPHONE (OUTPATIENT)
Dept: CARDIOLOGY | Age: 62
End: 2022-05-18

## 2022-05-19 ENCOUNTER — TELEPHONE (OUTPATIENT)
Dept: CARDIOLOGY | Age: 62
End: 2022-05-19

## 2022-05-19 RX ORDER — METOPROLOL SUCCINATE 25 MG/1
25 TABLET, EXTENDED RELEASE ORAL 2 TIMES DAILY
Qty: 180 TABLET | Refills: 3 | Status: SHIPPED | OUTPATIENT
Start: 2022-05-19 | End: 2022-10-26

## 2022-05-19 RX ORDER — LISINOPRIL 2.5 MG/1
2.5 TABLET ORAL AT BEDTIME
COMMUNITY
End: 2022-05-19 | Stop reason: SDUPTHER

## 2022-05-19 RX ORDER — LISINOPRIL 2.5 MG/1
2.5 TABLET ORAL AT BEDTIME
Qty: 90 TABLET | Refills: 1 | Status: SHIPPED | OUTPATIENT
Start: 2022-05-19 | End: 2022-10-20

## 2022-05-23 ENCOUNTER — HOSPITAL ENCOUNTER (OUTPATIENT)
Dept: ULTRASOUND IMAGING | Age: 62
Discharge: HOME | End: 2022-05-23
Attending: INTERNAL MEDICINE

## 2022-05-23 DIAGNOSIS — E78.41 ELEVATED LP(A): ICD-10-CM

## 2022-05-23 DIAGNOSIS — Z82.49 FAMILY HISTORY OF EARLY CAD: ICD-10-CM

## 2022-05-23 DIAGNOSIS — I49.3 PVC (PREMATURE VENTRICULAR CONTRACTION): ICD-10-CM

## 2022-05-23 DIAGNOSIS — R00.2 PALPITATIONS: ICD-10-CM

## 2022-05-23 DIAGNOSIS — I34.1 MVP (MITRAL VALVE PROLAPSE): ICD-10-CM

## 2022-05-23 PROCEDURE — 76706 US ABDL AORTA SCREEN AAA: CPT

## 2022-05-28 ENCOUNTER — E-ADVICE (OUTPATIENT)
Dept: CARDIOLOGY | Age: 62
End: 2022-05-28

## 2022-05-31 PROCEDURE — 93271 ECG/MONITORING AND ANALYSIS: CPT | Performed by: INTERNAL MEDICINE

## 2022-05-31 PROCEDURE — 93272 ECG/REVIEW INTERPRET ONLY: CPT | Performed by: INTERNAL MEDICINE

## 2022-06-01 ENCOUNTER — TELEPHONE (OUTPATIENT)
Dept: CARDIOLOGY | Age: 62
End: 2022-06-01

## 2022-06-23 ENCOUNTER — TELEPHONE (OUTPATIENT)
Dept: CARDIOLOGY | Age: 62
End: 2022-06-23

## 2022-08-16 ENCOUNTER — LAB SERVICES (OUTPATIENT)
Dept: LAB | Age: 62
End: 2022-08-16

## 2022-08-16 DIAGNOSIS — Z00.00 ENCOUNTER FOR GENERAL ADULT MEDICAL EXAMINATION WITHOUT ABNORMAL FINDINGS: ICD-10-CM

## 2022-08-16 DIAGNOSIS — Z00.00 ENCOUNTER FOR GENERAL ADULT MEDICAL EXAMINATION WITHOUT ABNORMAL FINDINGS: Primary | ICD-10-CM

## 2022-08-16 DIAGNOSIS — Z01.419 WELL WOMAN EXAM WITH ROUTINE GYNECOLOGICAL EXAM: Primary | ICD-10-CM

## 2022-08-16 DIAGNOSIS — Z01.419 WELL WOMAN EXAM WITH ROUTINE GYNECOLOGICAL EXAM: ICD-10-CM

## 2022-08-16 LAB
ALBUMIN SERPL-MCNC: 3.7 G/DL (ref 3.6–5.1)
ALBUMIN/GLOB SERPL: 1.4 {RATIO} (ref 1–2.4)
ALP SERPL-CCNC: 65 UNITS/L (ref 45–117)
ALT SERPL-CCNC: 23 UNITS/L
ANION GAP SERPL CALC-SCNC: 11 MMOL/L (ref 7–19)
AST SERPL-CCNC: 16 UNITS/L
BASOPHILS # BLD: 0.1 K/MCL (ref 0–0.3)
BASOPHILS NFR BLD: 1 %
BILIRUB SERPL-MCNC: 0.5 MG/DL (ref 0.2–1)
BUN SERPL-MCNC: 19 MG/DL (ref 6–20)
BUN/CREAT SERPL: 24 (ref 7–25)
CALCIUM SERPL-MCNC: 8.8 MG/DL (ref 8.4–10.2)
CHLORIDE SERPL-SCNC: 102 MMOL/L (ref 97–110)
CHOLEST SERPL-MCNC: 208 MG/DL
CHOLEST/HDLC SERPL: 2.8 {RATIO}
CO2 SERPL-SCNC: 31 MMOL/L (ref 21–32)
CREAT SERPL-MCNC: 0.8 MG/DL (ref 0.51–0.95)
DEPRECATED RDW RBC: 43.1 FL (ref 39–50)
EOSINOPHIL # BLD: 0.5 K/MCL (ref 0–0.5)
EOSINOPHIL NFR BLD: 11 %
ERYTHROCYTE [DISTWIDTH] IN BLOOD: 11.8 % (ref 11–15)
FASTING DURATION TIME PATIENT: 12 HOURS (ref 0–999)
FASTING DURATION TIME PATIENT: 12 HOURS (ref 0–999)
GFR SERPLBLD BASED ON 1.73 SQ M-ARVRAT: 83 ML/MIN
GLOBULIN SER-MCNC: 2.7 G/DL (ref 2–4)
GLUCOSE SERPL-MCNC: 101 MG/DL (ref 70–99)
HBA1C MFR BLD: 5.5 % (ref 4.5–5.6)
HCT VFR BLD CALC: 39.2 % (ref 36–46.5)
HDLC SERPL-MCNC: 73 MG/DL
HGB BLD-MCNC: 13.2 G/DL (ref 12–15.5)
IMM GRANULOCYTES # BLD AUTO: 0 K/MCL (ref 0–0.2)
IMM GRANULOCYTES # BLD: 0 %
LDLC SERPL CALC-MCNC: 107 MG/DL
LYMPHOCYTES # BLD: 1.6 K/MCL (ref 1–4)
LYMPHOCYTES NFR BLD: 34 %
MCH RBC QN AUTO: 32.8 PG (ref 26–34)
MCHC RBC AUTO-ENTMCNC: 33.7 G/DL (ref 32–36.5)
MCV RBC AUTO: 97.5 FL (ref 78–100)
MONOCYTES # BLD: 0.3 K/MCL (ref 0.3–0.9)
MONOCYTES NFR BLD: 7 %
NEUTROPHILS # BLD: 2.2 K/MCL (ref 1.8–7.7)
NEUTROPHILS NFR BLD: 47 %
NONHDLC SERPL-MCNC: 135 MG/DL
PLATELET # BLD AUTO: 169 K/MCL (ref 140–450)
POTASSIUM SERPL-SCNC: 4.3 MMOL/L (ref 3.4–5.1)
PROT SERPL-MCNC: 6.4 G/DL (ref 6.4–8.2)
RBC # BLD: 4.02 MIL/MCL (ref 4–5.2)
SODIUM SERPL-SCNC: 140 MMOL/L (ref 135–145)
TRIGL SERPL-MCNC: 142 MG/DL
WBC # BLD: 4.6 K/MCL (ref 4.2–11)

## 2022-08-16 PROCEDURE — 80061 LIPID PANEL: CPT | Performed by: INTERNAL MEDICINE

## 2022-08-16 PROCEDURE — 80053 COMPREHEN METABOLIC PANEL: CPT | Performed by: INTERNAL MEDICINE

## 2022-08-16 PROCEDURE — 85025 COMPLETE CBC W/AUTO DIFF WBC: CPT | Performed by: INTERNAL MEDICINE

## 2022-08-16 PROCEDURE — 83036 HEMOGLOBIN GLYCOSYLATED A1C: CPT | Performed by: INTERNAL MEDICINE

## 2022-08-16 PROCEDURE — 36415 COLL VENOUS BLD VENIPUNCTURE: CPT | Performed by: INTERNAL MEDICINE

## 2022-09-27 ENCOUNTER — ORDER TRANSCRIPTION (OUTPATIENT)
Dept: ADMINISTRATIVE | Facility: HOSPITAL | Age: 62
End: 2022-09-27

## 2022-09-27 DIAGNOSIS — Z13.6 SCREENING FOR CARDIOVASCULAR CONDITION: Primary | ICD-10-CM

## 2022-10-20 RX ORDER — LISINOPRIL 2.5 MG/1
2.5 TABLET ORAL AT BEDTIME
Qty: 90 TABLET | Refills: 1 | Status: SHIPPED | OUTPATIENT
Start: 2022-10-20 | End: 2023-03-07

## 2022-10-24 ENCOUNTER — HOSPITAL ENCOUNTER (OUTPATIENT)
Age: 62
Discharge: HOME | End: 2022-10-24
Payer: COMMERCIAL

## 2022-10-24 VITALS
HEIGHT: 70 IN | RESPIRATION RATE: 18 BRPM | DIASTOLIC BLOOD PRESSURE: 79 MMHG | BODY MASS INDEX: 21.47 KG/M2 | TEMPERATURE: 98 F | WEIGHT: 150 LBS | SYSTOLIC BLOOD PRESSURE: 146 MMHG | OXYGEN SATURATION: 98 % | HEART RATE: 67 BPM

## 2022-10-24 DIAGNOSIS — J11.1 INFLUENZA: Primary | ICD-10-CM

## 2022-10-24 LAB
POCT INFLUENZA A: POSITIVE
POCT INFLUENZA B: NEGATIVE

## 2022-10-24 PROCEDURE — 99203 OFFICE O/P NEW LOW 30 MIN: CPT

## 2022-10-24 PROCEDURE — 99202 OFFICE O/P NEW SF 15 MIN: CPT

## 2022-10-24 PROCEDURE — 87502 INFLUENZA DNA AMP PROBE: CPT | Performed by: PHYSICIAN ASSISTANT

## 2022-10-24 RX ORDER — FLUTICASONE PROPIONATE 50 MCG
2 SPRAY, SUSPENSION (ML) NASAL DAILY
Qty: 16 G | Refills: 0 | Status: SHIPPED | OUTPATIENT
Start: 2022-10-24 | End: 2022-11-23

## 2022-10-24 RX ORDER — LISINOPRIL 2.5 MG/1
TABLET ORAL
COMMUNITY
Start: 2022-10-21

## 2022-10-24 NOTE — ED INITIAL ASSESSMENT (HPI)
Pt comes in for eval of uri for past week. Reports fever of 100.9, congestion, cough, body aches and sore throat. Denies sob or cp. Home covid negative x2.

## 2022-10-26 RX ORDER — METOPROLOL SUCCINATE 25 MG/1
TABLET, EXTENDED RELEASE ORAL
Qty: 180 TABLET | Refills: 3 | Status: SHIPPED | OUTPATIENT
Start: 2022-10-26 | End: 2023-03-31

## 2022-11-03 ENCOUNTER — EXTERNAL RECORD (OUTPATIENT)
Dept: OTHER | Age: 62
End: 2022-11-03

## 2022-11-03 ENCOUNTER — HOSPITAL ENCOUNTER (OUTPATIENT)
Dept: CT IMAGING | Age: 62
Discharge: HOME | End: 2022-11-03
Attending: INTERNAL MEDICINE

## 2022-11-03 VITALS — HEIGHT: 70 IN | BODY MASS INDEX: 21.47 KG/M2 | WEIGHT: 150 LBS

## 2022-11-03 DIAGNOSIS — Z13.6 SCREENING FOR CARDIOVASCULAR CONDITION: ICD-10-CM

## 2022-11-03 LAB
POCT GLUCOSE CHOLESTECH: 102 (ref 70–99)
POCT HDL: 48 (ref 40–60)
POCT LDL: 116 (ref 0–99)
POCT TOTAL CHOLESTEROL: 203 (ref 110–200)
POCT TRIGLYCERIDES: 196 (ref 1–149)

## 2022-11-03 NOTE — ADDENDUM NOTE
Encounter addended by: Nicolette Mccray RN on: 11/3/2022 9:21 AM   Actions taken: Pharmacy for encounter modified, Order list changed, Result filed

## 2022-11-16 ENCOUNTER — TELEPHONE (OUTPATIENT)
Dept: CARDIOLOGY | Age: 62
End: 2022-11-16

## 2022-11-16 ENCOUNTER — E-ADVICE (OUTPATIENT)
Dept: CARDIOLOGY | Age: 62
End: 2022-11-16

## 2022-11-16 DIAGNOSIS — Z82.49 FAMILY HISTORY OF EARLY CAD: ICD-10-CM

## 2022-11-16 DIAGNOSIS — E78.41 ELEVATED LP(A): ICD-10-CM

## 2022-11-16 DIAGNOSIS — I49.3 PVC (PREMATURE VENTRICULAR CONTRACTION): ICD-10-CM

## 2022-11-16 DIAGNOSIS — R00.2 PALPITATIONS: ICD-10-CM

## 2022-11-16 DIAGNOSIS — I34.1 MVP (MITRAL VALVE PROLAPSE): Primary | ICD-10-CM

## 2023-01-09 ENCOUNTER — TELEPHONE (OUTPATIENT)
Dept: OBGYN | Age: 63
End: 2023-01-09

## 2023-01-09 DIAGNOSIS — Z12.39 ENCOUNTER FOR BREAST CANCER SCREENING OTHER THAN MAMMOGRAM: ICD-10-CM

## 2023-01-09 DIAGNOSIS — R92.30 DENSE BREAST: Primary | ICD-10-CM

## 2023-01-09 DIAGNOSIS — Z12.31 ENCOUNTER FOR SCREENING MAMMOGRAM FOR MALIGNANT NEOPLASM OF BREAST: ICD-10-CM

## 2023-01-09 DIAGNOSIS — R92.2 DENSE BREAST: Primary | ICD-10-CM

## 2023-02-06 ENCOUNTER — LAB SERVICES (OUTPATIENT)
Dept: LAB | Age: 63
End: 2023-02-06

## 2023-02-06 DIAGNOSIS — E78.41 ELEVATED LP(A): ICD-10-CM

## 2023-02-06 DIAGNOSIS — I49.3 PVC (PREMATURE VENTRICULAR CONTRACTION): ICD-10-CM

## 2023-02-06 DIAGNOSIS — Z82.49 FAMILY HISTORY OF EARLY CAD: ICD-10-CM

## 2023-02-06 DIAGNOSIS — R00.2 PALPITATIONS: ICD-10-CM

## 2023-02-06 DIAGNOSIS — I34.1 MVP (MITRAL VALVE PROLAPSE): ICD-10-CM

## 2023-02-06 LAB
25(OH)D3+25(OH)D2 SERPL-MCNC: 32.5 NG/ML (ref 30–100)
ALBUMIN SERPL-MCNC: 3.9 G/DL (ref 3.6–5.1)
ALBUMIN/GLOB SERPL: 1.4 {RATIO} (ref 1–2.4)
ALP SERPL-CCNC: 54 UNITS/L (ref 45–117)
ALT SERPL-CCNC: 38 UNITS/L
ANION GAP SERPL CALC-SCNC: 9 MMOL/L (ref 7–19)
AST SERPL-CCNC: 18 UNITS/L
BASOPHILS # BLD: 0.1 K/MCL (ref 0–0.3)
BASOPHILS NFR BLD: 2 %
BILIRUB SERPL-MCNC: 0.5 MG/DL (ref 0.2–1)
BUN SERPL-MCNC: 17 MG/DL (ref 6–20)
BUN/CREAT SERPL: 21 (ref 7–25)
CALCIUM SERPL-MCNC: 9 MG/DL (ref 8.4–10.2)
CHLORIDE SERPL-SCNC: 106 MMOL/L (ref 97–110)
CHOLEST SERPL-MCNC: 155 MG/DL
CHOLEST/HDLC SERPL: 2.1 {RATIO}
CO2 SERPL-SCNC: 28 MMOL/L (ref 21–32)
CREAT SERPL-MCNC: 0.8 MG/DL (ref 0.51–0.95)
DEPRECATED RDW RBC: 43.1 FL (ref 39–50)
EOSINOPHIL # BLD: 0.5 K/MCL (ref 0–0.5)
EOSINOPHIL NFR BLD: 12 %
ERYTHROCYTE [DISTWIDTH] IN BLOOD: 11.9 % (ref 11–15)
FASTING DURATION TIME PATIENT: ABNORMAL H
FASTING DURATION TIME PATIENT: NORMAL H
GFR SERPLBLD BASED ON 1.73 SQ M-ARVRAT: 83 ML/MIN
GLOBULIN SER-MCNC: 2.8 G/DL (ref 2–4)
GLUCOSE SERPL-MCNC: 101 MG/DL (ref 70–99)
HCT VFR BLD CALC: 43.4 % (ref 36–46.5)
HDLC SERPL-MCNC: 73 MG/DL
HGB BLD-MCNC: 14.2 G/DL (ref 12–15.5)
IMM GRANULOCYTES # BLD AUTO: 0 K/MCL (ref 0–0.2)
IMM GRANULOCYTES # BLD: 0 %
LDLC SERPL CALC-MCNC: 69 MG/DL
LYMPHOCYTES # BLD: 1.1 K/MCL (ref 1–4)
LYMPHOCYTES NFR BLD: 27 %
MCH RBC QN AUTO: 32.4 PG (ref 26–34)
MCHC RBC AUTO-ENTMCNC: 32.7 G/DL (ref 32–36.5)
MCV RBC AUTO: 99.1 FL (ref 78–100)
MONOCYTES # BLD: 0.4 K/MCL (ref 0.3–0.9)
MONOCYTES NFR BLD: 9 %
NEUTROPHILS # BLD: 2.1 K/MCL (ref 1.8–7.7)
NEUTROPHILS NFR BLD: 50 %
NONHDLC SERPL-MCNC: 82 MG/DL
NRBC BLD MANUAL-RTO: 0 /100 WBC
PLATELET # BLD AUTO: 173 K/MCL (ref 140–450)
POTASSIUM SERPL-SCNC: 3.9 MMOL/L (ref 3.4–5.1)
PROT SERPL-MCNC: 6.7 G/DL (ref 6.4–8.2)
RBC # BLD: 4.38 MIL/MCL (ref 4–5.2)
SODIUM SERPL-SCNC: 139 MMOL/L (ref 135–145)
TRIGL SERPL-MCNC: 64 MG/DL
WBC # BLD: 4.1 K/MCL (ref 4.2–11)

## 2023-02-06 PROCEDURE — 80061 LIPID PANEL: CPT | Performed by: INTERNAL MEDICINE

## 2023-02-06 PROCEDURE — 82306 VITAMIN D 25 HYDROXY: CPT | Performed by: INTERNAL MEDICINE

## 2023-02-06 PROCEDURE — 85025 COMPLETE CBC W/AUTO DIFF WBC: CPT | Performed by: INTERNAL MEDICINE

## 2023-02-06 PROCEDURE — 80053 COMPREHEN METABOLIC PANEL: CPT | Performed by: INTERNAL MEDICINE

## 2023-02-06 PROCEDURE — 36415 COLL VENOUS BLD VENIPUNCTURE: CPT | Performed by: INTERNAL MEDICINE

## 2023-02-06 RX ORDER — CHOLECALCIFEROL (VITAMIN D3) 50 MCG
2000 TABLET ORAL DAILY
COMMUNITY

## 2023-02-27 ENCOUNTER — HOSPITAL ENCOUNTER (OUTPATIENT)
Dept: CT IMAGING | Age: 63
Discharge: HOME | End: 2023-02-27
Attending: OBSTETRICS & GYNECOLOGY

## 2023-02-27 DIAGNOSIS — R92.2 DENSE BREAST: ICD-10-CM

## 2023-02-27 DIAGNOSIS — Z12.31 ENCOUNTER FOR SCREENING MAMMOGRAM FOR MALIGNANT NEOPLASM OF BREAST: ICD-10-CM

## 2023-02-27 DIAGNOSIS — R92.30 DENSE BREAST: ICD-10-CM

## 2023-02-27 DIAGNOSIS — Z12.39 ENCOUNTER FOR BREAST CANCER SCREENING OTHER THAN MAMMOGRAM: ICD-10-CM

## 2023-02-27 PROCEDURE — 77063 BREAST TOMOSYNTHESIS BI: CPT

## 2023-02-27 PROCEDURE — 76641 ULTRASOUND BREAST COMPLETE: CPT

## 2023-03-07 RX ORDER — LISINOPRIL 2.5 MG/1
2.5 TABLET ORAL AT BEDTIME
Qty: 90 TABLET | Refills: 1 | Status: SHIPPED | OUTPATIENT
Start: 2023-03-07 | End: 2023-06-09 | Stop reason: SDUPTHER

## 2023-03-16 ENCOUNTER — OFFICE VISIT (OUTPATIENT)
Dept: OBGYN | Age: 63
End: 2023-03-16

## 2023-03-16 VITALS
SYSTOLIC BLOOD PRESSURE: 117 MMHG | WEIGHT: 155.2 LBS | OXYGEN SATURATION: 98 % | BODY MASS INDEX: 22.22 KG/M2 | HEIGHT: 70 IN | DIASTOLIC BLOOD PRESSURE: 71 MMHG | RESPIRATION RATE: 17 BRPM | HEART RATE: 62 BPM

## 2023-03-16 DIAGNOSIS — Z01.419 GYNECOLOGIC EXAM NORMAL: Primary | ICD-10-CM

## 2023-03-16 PROCEDURE — 99396 PREV VISIT EST AGE 40-64: CPT | Performed by: OBSTETRICS & GYNECOLOGY

## 2023-03-16 ASSESSMENT — ENCOUNTER SYMPTOMS
FEVER: 0
HEADACHES: 0
APPETITE CHANGE: 0
BLOOD IN STOOL: 0
FATIGUE: 1
DIARRHEA: 0
SHORTNESS OF BREATH: 0
CHILLS: 0
ACTIVITY CHANGE: 0
NERVOUS/ANXIOUS: 1
COUGH: 1
CHEST TIGHTNESS: 0
CONSTIPATION: 0
ANAL BLEEDING: 0
ABDOMINAL DISTENTION: 0

## 2023-03-16 ASSESSMENT — PAIN SCALES - GENERAL: PAINLEVEL: 0

## 2023-03-31 RX ORDER — METOPROLOL SUCCINATE 25 MG/1
TABLET, EXTENDED RELEASE ORAL
Qty: 180 TABLET | Refills: 3 | Status: CANCELLED | OUTPATIENT
Start: 2023-03-31

## 2023-04-03 RX ORDER — METOPROLOL SUCCINATE 25 MG/1
TABLET, EXTENDED RELEASE ORAL
Qty: 180 TABLET | Refills: 0 | Status: SHIPPED | OUTPATIENT
Start: 2023-04-03 | End: 2023-06-09 | Stop reason: SDUPTHER

## 2023-04-25 ENCOUNTER — OFFICE VISIT (OUTPATIENT)
Dept: CARDIOLOGY | Age: 63
End: 2023-04-25

## 2023-04-25 VITALS
WEIGHT: 152 LBS | HEART RATE: 69 BPM | HEIGHT: 70 IN | BODY MASS INDEX: 21.76 KG/M2 | SYSTOLIC BLOOD PRESSURE: 117 MMHG | DIASTOLIC BLOOD PRESSURE: 75 MMHG | OXYGEN SATURATION: 99 %

## 2023-04-25 DIAGNOSIS — Z82.49 FAMILY HISTORY OF EARLY CAD: ICD-10-CM

## 2023-04-25 DIAGNOSIS — R00.2 PALPITATIONS: ICD-10-CM

## 2023-04-25 DIAGNOSIS — I34.1 MVP (MITRAL VALVE PROLAPSE): ICD-10-CM

## 2023-04-25 DIAGNOSIS — E78.41 ELEVATED LP(A): ICD-10-CM

## 2023-04-25 DIAGNOSIS — I49.3 PVC (PREMATURE VENTRICULAR CONTRACTION): Primary | ICD-10-CM

## 2023-04-25 PROCEDURE — 99214 OFFICE O/P EST MOD 30 MIN: CPT | Performed by: INTERNAL MEDICINE

## 2023-06-09 ENCOUNTER — TELEPHONE (OUTPATIENT)
Dept: CARDIOLOGY | Age: 63
End: 2023-06-09

## 2023-06-09 RX ORDER — METOPROLOL SUCCINATE 25 MG/1
25 TABLET, EXTENDED RELEASE ORAL 2 TIMES DAILY
Qty: 180 TABLET | Refills: 3 | Status: SHIPPED | OUTPATIENT
Start: 2023-06-09

## 2023-06-09 RX ORDER — LISINOPRIL 2.5 MG/1
2.5 TABLET ORAL AT BEDTIME
Qty: 90 TABLET | Refills: 3 | Status: SHIPPED | OUTPATIENT
Start: 2023-06-09

## 2023-07-21 ENCOUNTER — ANCILLARY PROCEDURE (OUTPATIENT)
Dept: CARDIOLOGY | Age: 63
End: 2023-07-21
Attending: INTERNAL MEDICINE

## 2023-07-21 DIAGNOSIS — I49.3 PVC (PREMATURE VENTRICULAR CONTRACTION): ICD-10-CM

## 2023-07-21 DIAGNOSIS — I34.1 MVP (MITRAL VALVE PROLAPSE): ICD-10-CM

## 2023-07-21 DIAGNOSIS — R00.2 PALPITATIONS: ICD-10-CM

## 2023-07-21 DIAGNOSIS — E78.41 ELEVATED LP(A): ICD-10-CM

## 2023-07-21 DIAGNOSIS — Z82.49 FAMILY HISTORY OF EARLY CAD: ICD-10-CM

## 2023-07-21 LAB
AORTIC VALVE AREA (AVA): 0.74
ASCENDING AORTA (AAD): 3
AV PEAK GRADIENT (AVPG): 6
AV PEAK VELOCITY (AVPV): 1.19
AV STENOSIS SEVERITY TEXT: NORMAL
AVI LVOT PEAK GRADIENT (LVOTMG): 1.1
E WAVE DECELARATION TIME (MDT): 9.1
LEFT INTERNAL DIMENSION IN SYSTOLE (LVSD): 1.1
LEFT VENTRICULAR INTERNAL DIMENSION IN DIASTOLE (LVDD): 3.4
LEFT VENTRICULAR POSTERIOR WALL IN END DIASTOLE (LVPW): 5.1
LV EF: NORMAL %
LVOT 2D (LVOTD): 19.2
LVOT VTI (LVOTVTI): 1.05
MV E TISSUE VEL MED (MESV): 12
MV E WAVE VEL/E TISSUE VEL MED(MSR): 8.08
MV PEAK A VELOCITY (MVPAV): 258
MV PEAK E VELOCITY (MVPEV): 0.57
RV END SYSTOLIC LONGITUDINAL STRAIN FREE WALL (RVGS): 2
TRICUSPID VALVE ANNULAR PEAK VELOCITY (TVAPV): 23
TRICUSPID VALVE PEAK REGURGITATION VELOCITY (TRPV): 3
TV ESTIMATED RIGHT ARTERIAL PRESSURE (RAP): 16

## 2023-07-21 PROCEDURE — 93306 TTE W/DOPPLER COMPLETE: CPT | Performed by: INTERNAL MEDICINE

## 2023-08-03 ENCOUNTER — TELEPHONE (OUTPATIENT)
Dept: CARDIOLOGY | Age: 63
End: 2023-08-03

## 2024-01-05 DIAGNOSIS — Z12.31 VISIT FOR SCREENING MAMMOGRAM: Primary | ICD-10-CM

## 2024-01-05 DIAGNOSIS — R92.30 DENSE BREAST: ICD-10-CM

## 2024-01-05 DIAGNOSIS — R92.2 DENSE BREAST: ICD-10-CM

## 2024-01-05 DIAGNOSIS — R92.30 DENSE BREASTS: ICD-10-CM

## 2024-01-05 DIAGNOSIS — Z12.39 ENCOUNTER FOR BREAST CANCER SCREENING OTHER THAN MAMMOGRAM: ICD-10-CM

## 2024-01-05 DIAGNOSIS — R92.2 DENSE BREASTS: ICD-10-CM

## 2024-01-05 DIAGNOSIS — Z12.31 ENCOUNTER FOR SCREENING MAMMOGRAM FOR MALIGNANT NEOPLASM OF BREAST: Primary | ICD-10-CM

## 2024-01-29 ENCOUNTER — TELEPHONE (OUTPATIENT)
Dept: CARDIOLOGY | Age: 64
End: 2024-01-29

## 2024-01-29 DIAGNOSIS — Z82.49 FAMILY HISTORY OF EARLY CAD: ICD-10-CM

## 2024-01-29 DIAGNOSIS — R00.2 PALPITATIONS: ICD-10-CM

## 2024-01-29 DIAGNOSIS — I34.1 MVP (MITRAL VALVE PROLAPSE): ICD-10-CM

## 2024-01-29 DIAGNOSIS — I49.3 PVC (PREMATURE VENTRICULAR CONTRACTION): Primary | ICD-10-CM

## 2024-02-01 ENCOUNTER — LAB SERVICES (OUTPATIENT)
Dept: LAB | Age: 64
End: 2024-02-01

## 2024-02-01 DIAGNOSIS — E78.41 ELEVATED LP(A): ICD-10-CM

## 2024-02-01 DIAGNOSIS — Z82.49 FAMILY HISTORY OF EARLY CAD: ICD-10-CM

## 2024-02-01 DIAGNOSIS — I34.1 MVP (MITRAL VALVE PROLAPSE): ICD-10-CM

## 2024-02-01 DIAGNOSIS — I49.3 PVC (PREMATURE VENTRICULAR CONTRACTION): ICD-10-CM

## 2024-02-01 DIAGNOSIS — R00.2 PALPITATIONS: ICD-10-CM

## 2024-02-01 LAB
25(OH)D3+25(OH)D2 SERPL-MCNC: 38 NG/ML (ref 30–100)
ALBUMIN SERPL-MCNC: 4.1 G/DL (ref 3.6–5.1)
ALBUMIN/GLOB SERPL: 1.5 {RATIO} (ref 1–2.4)
ALP SERPL-CCNC: 55 UNITS/L (ref 45–117)
ALT SERPL-CCNC: 35 UNITS/L
ANION GAP SERPL CALC-SCNC: 12 MMOL/L (ref 7–19)
AST SERPL-CCNC: 19 UNITS/L
BASOPHILS # BLD: 0.1 K/MCL (ref 0–0.3)
BASOPHILS NFR BLD: 2 %
BILIRUB SERPL-MCNC: 0.6 MG/DL (ref 0.2–1)
BUN SERPL-MCNC: 16 MG/DL (ref 6–20)
BUN/CREAT SERPL: 19 (ref 7–25)
CALCIUM SERPL-MCNC: 9 MG/DL (ref 8.4–10.2)
CHLORIDE SERPL-SCNC: 108 MMOL/L (ref 97–110)
CHOLEST SERPL-MCNC: 180 MG/DL
CHOLEST/HDLC SERPL: 2.7 {RATIO}
CO2 SERPL-SCNC: 25 MMOL/L (ref 21–32)
CREAT SERPL-MCNC: 0.83 MG/DL (ref 0.51–0.95)
DEPRECATED RDW RBC: 42.1 FL (ref 39–50)
EGFRCR SERPLBLD CKD-EPI 2021: 79 ML/MIN/{1.73_M2}
EOSINOPHIL # BLD: 0.5 K/MCL (ref 0–0.5)
EOSINOPHIL NFR BLD: 11 %
ERYTHROCYTE [DISTWIDTH] IN BLOOD: 11.5 % (ref 11–15)
FASTING DURATION TIME PATIENT: NORMAL H
GLOBULIN SER-MCNC: 2.8 G/DL (ref 2–4)
GLUCOSE SERPL-MCNC: 88 MG/DL (ref 70–99)
HCT VFR BLD CALC: 43.6 % (ref 36–46.5)
HDLC SERPL-MCNC: 66 MG/DL
HGB BLD-MCNC: 14.5 G/DL (ref 12–15.5)
IMM GRANULOCYTES # BLD AUTO: 0 K/MCL (ref 0–0.2)
IMM GRANULOCYTES # BLD: 0 %
LDLC SERPL CALC-MCNC: 100 MG/DL
LYMPHOCYTES # BLD: 1.5 K/MCL (ref 1–4)
LYMPHOCYTES NFR BLD: 36 %
MCH RBC QN AUTO: 33 PG (ref 26–34)
MCHC RBC AUTO-ENTMCNC: 33.3 G/DL (ref 32–36.5)
MCV RBC AUTO: 99.1 FL (ref 78–100)
MONOCYTES # BLD: 0.4 K/MCL (ref 0.3–0.9)
MONOCYTES NFR BLD: 8 %
NEUTROPHILS # BLD: 1.8 K/MCL (ref 1.8–7.7)
NEUTROPHILS NFR BLD: 43 %
NONHDLC SERPL-MCNC: 114 MG/DL
NRBC BLD MANUAL-RTO: 0 /100 WBC
PLATELET # BLD AUTO: 151 K/MCL (ref 140–450)
POTASSIUM SERPL-SCNC: 3.9 MMOL/L (ref 3.4–5.1)
PROT SERPL-MCNC: 6.9 G/DL (ref 6.4–8.2)
RBC # BLD: 4.4 MIL/MCL (ref 4–5.2)
SODIUM SERPL-SCNC: 141 MMOL/L (ref 135–145)
T4 SERPL-MCNC: 6.6 MCG/DL (ref 4.7–13.3)
TRIGL SERPL-MCNC: 72 MG/DL
WBC # BLD: 4.3 K/MCL (ref 4.2–11)

## 2024-02-01 PROCEDURE — 85025 COMPLETE CBC W/AUTO DIFF WBC: CPT | Performed by: CLINICAL MEDICAL LABORATORY

## 2024-02-01 PROCEDURE — 84436 ASSAY OF TOTAL THYROXINE: CPT | Performed by: CLINICAL MEDICAL LABORATORY

## 2024-02-01 PROCEDURE — 80061 LIPID PANEL: CPT | Performed by: CLINICAL MEDICAL LABORATORY

## 2024-02-01 PROCEDURE — 80053 COMPREHEN METABOLIC PANEL: CPT | Performed by: CLINICAL MEDICAL LABORATORY

## 2024-02-01 PROCEDURE — 36415 COLL VENOUS BLD VENIPUNCTURE: CPT | Performed by: INTERNAL MEDICINE

## 2024-02-01 PROCEDURE — 82306 VITAMIN D 25 HYDROXY: CPT | Performed by: CLINICAL MEDICAL LABORATORY

## 2024-02-02 ENCOUNTER — TELEPHONE (OUTPATIENT)
Dept: CARDIOLOGY | Age: 64
End: 2024-02-02

## 2024-03-05 ENCOUNTER — OFFICE VISIT (OUTPATIENT)
Dept: CARDIOLOGY | Age: 64
End: 2024-03-05

## 2024-03-05 VITALS
WEIGHT: 154.98 LBS | HEIGHT: 70 IN | BODY MASS INDEX: 22.19 KG/M2 | OXYGEN SATURATION: 100 % | SYSTOLIC BLOOD PRESSURE: 133 MMHG | DIASTOLIC BLOOD PRESSURE: 82 MMHG | HEART RATE: 59 BPM

## 2024-03-05 DIAGNOSIS — I34.1 MVP (MITRAL VALVE PROLAPSE): ICD-10-CM

## 2024-03-05 DIAGNOSIS — I34.0 NONRHEUMATIC MITRAL VALVE REGURGITATION: ICD-10-CM

## 2024-03-05 DIAGNOSIS — I77.3 FIBROMUSCULAR DYSPLASIA (CMD): ICD-10-CM

## 2024-03-05 DIAGNOSIS — E78.41 ELEVATED LP(A): Primary | ICD-10-CM

## 2024-03-05 DIAGNOSIS — I49.3 PVC (PREMATURE VENTRICULAR CONTRACTION): ICD-10-CM

## 2024-03-05 PROCEDURE — 99214 OFFICE O/P EST MOD 30 MIN: CPT | Performed by: INTERNAL MEDICINE

## 2024-03-05 RX ORDER — LOSARTAN POTASSIUM 25 MG/1
25 TABLET ORAL NIGHTLY
Qty: 90 TABLET | Refills: 0 | Status: SHIPPED | OUTPATIENT
Start: 2024-03-05

## 2024-03-05 SDOH — HEALTH STABILITY: MENTAL HEALTH: DEPRESSION SCREENING SCORE: 0

## 2024-03-05 SDOH — HEALTH STABILITY: MENTAL HEALTH: FEELING DOWN, DEPRESSED OR HOPELESS?: NOT AT ALL

## 2024-03-05 SDOH — HEALTH STABILITY: MENTAL HEALTH: PHQ2 INTERPRETATION: NO FURTHER SCREENING NEEDED

## 2024-03-05 SDOH — HEALTH STABILITY: PHYSICAL HEALTH: ON AVERAGE, HOW MANY DAYS PER WEEK DO YOU ENGAGE IN MODERATE TO STRENUOUS EXERCISE (LIKE A BRISK WALK)?: 4 DAYS

## 2024-03-05 SDOH — HEALTH STABILITY: MENTAL HEALTH: LITTLE INTEREST OR PLEASURE IN ACTIVITY?: NOT AT ALL

## 2024-03-05 SDOH — HEALTH STABILITY: PHYSICAL HEALTH: ON AVERAGE, HOW MANY MINUTES DO YOU ENGAGE IN EXERCISE AT THIS LEVEL?: 50 MIN

## 2024-03-05 ASSESSMENT — PATIENT HEALTH QUESTIONNAIRE - PHQ9: SUM OF ALL RESPONSES TO PHQ9 QUESTIONS 1 AND 2: 0

## 2024-03-07 ENCOUNTER — HOSPITAL ENCOUNTER (OUTPATIENT)
Dept: CT IMAGING | Age: 64
Discharge: HOME | End: 2024-03-07
Attending: OBSTETRICS & GYNECOLOGY

## 2024-03-07 DIAGNOSIS — Z12.31 VISIT FOR SCREENING MAMMOGRAM: ICD-10-CM

## 2024-03-07 DIAGNOSIS — R92.2 DENSE BREAST: ICD-10-CM

## 2024-03-07 DIAGNOSIS — R92.30 DENSE BREAST: ICD-10-CM

## 2024-03-07 PROCEDURE — 77063 BREAST TOMOSYNTHESIS BI: CPT

## 2024-03-07 PROCEDURE — 76641 ULTRASOUND BREAST COMPLETE: CPT

## 2024-03-22 ENCOUNTER — APPOINTMENT (OUTPATIENT)
Dept: OBGYN | Age: 64
End: 2024-03-22

## 2024-03-22 ENCOUNTER — E-ADVICE (OUTPATIENT)
Dept: OBGYN | Age: 64
End: 2024-03-22

## 2024-03-22 VITALS — OXYGEN SATURATION: 98 % | BODY MASS INDEX: 22.39 KG/M2 | WEIGHT: 156.42 LBS | RESPIRATION RATE: 16 BRPM | HEIGHT: 70 IN

## 2024-03-22 DIAGNOSIS — Z12.4 SCREENING FOR CERVICAL CANCER: Primary | ICD-10-CM

## 2024-03-22 ASSESSMENT — ENCOUNTER SYMPTOMS
NAUSEA: 0
DIARRHEA: 0
LIGHT-HEADEDNESS: 1
NERVOUS/ANXIOUS: 1
CHEST TIGHTNESS: 0
CONSTIPATION: 0
COUGH: 1
SHORTNESS OF BREATH: 0
CONSTITUTIONAL NEGATIVE: 1
ABDOMINAL DISTENTION: 0

## 2024-03-22 ASSESSMENT — PATIENT HEALTH QUESTIONNAIRE - PHQ9
CLINICAL INTERPRETATION OF PHQ2 SCORE: NO FURTHER SCREENING NEEDED
2. FEELING DOWN, DEPRESSED OR HOPELESS: NOT AT ALL
SUM OF ALL RESPONSES TO PHQ9 QUESTIONS 1 AND 2: 0
SUM OF ALL RESPONSES TO PHQ9 QUESTIONS 1 AND 2: 0
1. LITTLE INTEREST OR PLEASURE IN DOING THINGS: NOT AT ALL

## 2024-03-22 ASSESSMENT — PAIN SCALES - GENERAL: PAINLEVEL: 0

## 2024-04-02 LAB
CASE RPRT: NORMAL
CLINICAL INFO: NORMAL
CYTOLOGY CVX/VAG STUDY: NORMAL
HPV16+18+45 E6+E7MRNA CVX NAA+PROBE: NEGATIVE
Lab: NORMAL
PAP EDUCATIONAL NOTE: NORMAL
SPECIMEN ADEQUACY: NORMAL

## 2024-04-04 ENCOUNTER — E-ADVICE (OUTPATIENT)
Dept: OBGYN | Age: 64
End: 2024-04-04

## 2024-05-28 RX ORDER — LOSARTAN POTASSIUM 25 MG/1
25 TABLET ORAL NIGHTLY
Qty: 90 TABLET | Refills: 2 | Status: SHIPPED | OUTPATIENT
Start: 2024-05-28

## 2024-06-27 RX ORDER — METOPROLOL SUCCINATE 25 MG/1
25 TABLET, EXTENDED RELEASE ORAL 2 TIMES DAILY
Qty: 180 TABLET | Refills: 3 | Status: SHIPPED | OUTPATIENT
Start: 2024-06-27

## 2024-09-25 ENCOUNTER — OFFICE VISIT (OUTPATIENT)
Dept: CARDIOLOGY | Age: 64
End: 2024-09-25

## 2024-09-25 VITALS
HEIGHT: 70 IN | OXYGEN SATURATION: 97 % | DIASTOLIC BLOOD PRESSURE: 79 MMHG | WEIGHT: 152.56 LBS | BODY MASS INDEX: 21.84 KG/M2 | SYSTOLIC BLOOD PRESSURE: 129 MMHG | HEART RATE: 55 BPM

## 2024-09-25 DIAGNOSIS — I49.3 PVC (PREMATURE VENTRICULAR CONTRACTION): ICD-10-CM

## 2024-09-25 DIAGNOSIS — I34.0 NONRHEUMATIC MITRAL VALVE REGURGITATION: Primary | ICD-10-CM

## 2024-09-25 DIAGNOSIS — I25.2 HISTORY OF MYOCARDIAL INFARCTION: ICD-10-CM

## 2024-09-25 DIAGNOSIS — I21.4 NSTEMI (NON-ST ELEVATED MYOCARDIAL INFARCTION)  (CMD): ICD-10-CM

## 2024-09-25 DIAGNOSIS — R00.2 PALPITATIONS: ICD-10-CM

## 2024-09-25 PROCEDURE — 99215 OFFICE O/P EST HI 40 MIN: CPT | Performed by: INTERNAL MEDICINE

## 2024-09-25 RX ORDER — FAMOTIDINE 20 MG/1
1 TABLET, FILM COATED ORAL 2 TIMES DAILY
COMMUNITY
Start: 2024-09-22

## 2024-09-25 RX ORDER — ATORVASTATIN CALCIUM 10 MG/1
10 TABLET, FILM COATED ORAL DAILY
COMMUNITY
Start: 2024-09-22

## 2024-09-25 RX ORDER — CLOPIDOGREL BISULFATE 75 MG/1
75 TABLET ORAL DAILY
COMMUNITY
Start: 2024-09-23

## 2024-09-25 SDOH — HEALTH STABILITY: PHYSICAL HEALTH: ON AVERAGE, HOW MANY MINUTES DO YOU ENGAGE IN EXERCISE AT THIS LEVEL?: 30 MIN

## 2024-09-25 SDOH — HEALTH STABILITY: PHYSICAL HEALTH: ON AVERAGE, HOW MANY DAYS PER WEEK DO YOU ENGAGE IN MODERATE TO STRENUOUS EXERCISE (LIKE A BRISK WALK)?: 5 DAYS

## 2024-09-25 ASSESSMENT — PATIENT HEALTH QUESTIONNAIRE - PHQ9
2. FEELING DOWN, DEPRESSED OR HOPELESS: NOT AT ALL
SUM OF ALL RESPONSES TO PHQ9 QUESTIONS 1 AND 2: 0
SUM OF ALL RESPONSES TO PHQ9 QUESTIONS 1 AND 2: 0
1. LITTLE INTEREST OR PLEASURE IN DOING THINGS: NOT AT ALL
CLINICAL INTERPRETATION OF PHQ2 SCORE: NO FURTHER SCREENING NEEDED

## 2024-09-27 ENCOUNTER — ANCILLARY PROCEDURE (OUTPATIENT)
Dept: CARDIOLOGY | Age: 64
End: 2024-09-27
Attending: INTERNAL MEDICINE

## 2024-09-27 DIAGNOSIS — I34.0 NONRHEUMATIC MITRAL VALVE REGURGITATION: ICD-10-CM

## 2024-09-27 DIAGNOSIS — E78.41 ELEVATED LP(A): ICD-10-CM

## 2024-09-27 DIAGNOSIS — I49.3 PVC (PREMATURE VENTRICULAR CONTRACTION): ICD-10-CM

## 2024-09-27 DIAGNOSIS — I77.3 FIBROMUSCULAR DYSPLASIA (CMD): ICD-10-CM

## 2024-09-27 PROCEDURE — 93880 EXTRACRANIAL BILAT STUDY: CPT | Performed by: INTERNAL MEDICINE

## 2024-10-01 ENCOUNTER — E-ADVICE (OUTPATIENT)
Dept: CARDIOLOGY | Age: 64
End: 2024-10-01

## 2024-10-18 ENCOUNTER — APPOINTMENT (OUTPATIENT)
Dept: CARDIOLOGY | Age: 64
End: 2024-10-18
Attending: INTERNAL MEDICINE

## 2024-10-18 ENCOUNTER — CLINICAL ABSTRACT (OUTPATIENT)
Dept: HEALTH INFORMATION MANAGEMENT | Facility: OTHER | Age: 64
End: 2024-10-18

## 2024-10-18 DIAGNOSIS — I49.3 PVC (PREMATURE VENTRICULAR CONTRACTION): ICD-10-CM

## 2024-10-18 DIAGNOSIS — R00.2 PALPITATIONS: ICD-10-CM

## 2024-10-18 DIAGNOSIS — I34.0 NONRHEUMATIC MITRAL VALVE REGURGITATION: ICD-10-CM

## 2024-10-18 DIAGNOSIS — I21.4 NSTEMI (NON-ST ELEVATED MYOCARDIAL INFARCTION)  (CMD): ICD-10-CM

## 2024-10-21 ENCOUNTER — E-ADVICE (OUTPATIENT)
Dept: CARDIOLOGY | Age: 64
End: 2024-10-21

## 2024-10-21 RX ORDER — ATORVASTATIN CALCIUM 10 MG/1
10 TABLET, FILM COATED ORAL DAILY
Qty: 90 TABLET | Refills: 1 | Status: SHIPPED | OUTPATIENT
Start: 2024-10-21

## 2024-10-21 RX ORDER — CLOPIDOGREL BISULFATE 75 MG/1
75 TABLET ORAL DAILY
Qty: 90 TABLET | Refills: 1 | Status: SHIPPED | OUTPATIENT
Start: 2024-10-21

## 2024-10-29 ENCOUNTER — HOSPITAL ENCOUNTER (OUTPATIENT)
Dept: MRI IMAGING | Age: 64
Discharge: HOME | End: 2024-10-29
Attending: INTERNAL MEDICINE

## 2024-10-29 DIAGNOSIS — I21.4 NSTEMI (NON-ST ELEVATED MYOCARDIAL INFARCTION)  (CMD): ICD-10-CM

## 2024-10-29 DIAGNOSIS — I34.0 NONRHEUMATIC MITRAL VALVE REGURGITATION: ICD-10-CM

## 2024-10-29 DIAGNOSIS — R00.2 PALPITATIONS: ICD-10-CM

## 2024-10-29 DIAGNOSIS — I49.3 PVC (PREMATURE VENTRICULAR CONTRACTION): ICD-10-CM

## 2024-10-29 PROCEDURE — 75561 CARDIAC MRI FOR MORPH W/DYE: CPT

## 2024-10-29 PROCEDURE — A9577 INJ MULTIHANCE: HCPCS | Performed by: INTERNAL MEDICINE

## 2024-10-29 PROCEDURE — 10002805 HB CONTRAST AGENT: Performed by: INTERNAL MEDICINE

## 2024-10-29 PROCEDURE — 75561 CARDIAC MRI FOR MORPH W/DYE: CPT | Performed by: INTERNAL MEDICINE

## 2024-10-29 RX ADMIN — GADOBENATE DIMEGLUMINE 15 ML: 529 INJECTION, SOLUTION INTRAVENOUS at 15:01

## 2024-10-30 ENCOUNTER — TELEPHONE (OUTPATIENT)
Dept: CARDIOLOGY | Age: 64
End: 2024-10-30

## 2024-11-08 PROCEDURE — 93228 REMOTE 30 DAY ECG REV/REPORT: CPT | Performed by: INTERNAL MEDICINE

## 2024-11-11 ENCOUNTER — E-ADVICE (OUTPATIENT)
Dept: CARDIOLOGY | Age: 64
End: 2024-11-11

## 2024-11-12 ENCOUNTER — TELEPHONE (OUTPATIENT)
Dept: CARDIOLOGY | Age: 64
End: 2024-11-12

## 2024-11-12 DIAGNOSIS — I21.4 NSTEMI (NON-ST ELEVATED MYOCARDIAL INFARCTION)  (CMD): Primary | ICD-10-CM

## 2024-11-16 ENCOUNTER — LAB SERVICES (OUTPATIENT)
Dept: LAB | Age: 64
End: 2024-11-16

## 2024-11-16 DIAGNOSIS — I25.2 HISTORY OF MYOCARDIAL INFARCTION: ICD-10-CM

## 2024-11-16 DIAGNOSIS — R00.2 PALPITATIONS: ICD-10-CM

## 2024-11-16 DIAGNOSIS — I21.4 NSTEMI (NON-ST ELEVATED MYOCARDIAL INFARCTION)  (CMD): ICD-10-CM

## 2024-11-16 DIAGNOSIS — I49.3 PVC (PREMATURE VENTRICULAR CONTRACTION): ICD-10-CM

## 2024-11-16 DIAGNOSIS — I34.0 NONRHEUMATIC MITRAL VALVE REGURGITATION: ICD-10-CM

## 2024-11-16 LAB
CHOLEST SERPL-MCNC: 175 MG/DL
CHOLEST/HDLC SERPL: 2 {RATIO}
HDLC SERPL-MCNC: 86 MG/DL
LDLC SERPL CALC-MCNC: 66 MG/DL
NONHDLC SERPL-MCNC: 89 MG/DL
TRIGL SERPL-MCNC: 115 MG/DL
TROPONIN I SERPL DL<=0.01 NG/ML-MCNC: <4 NG/L

## 2024-11-16 PROCEDURE — 84484 ASSAY OF TROPONIN QUANT: CPT | Performed by: CLINICAL MEDICAL LABORATORY

## 2024-11-16 PROCEDURE — 80061 LIPID PANEL: CPT | Performed by: CLINICAL MEDICAL LABORATORY

## 2024-11-16 PROCEDURE — 36415 COLL VENOUS BLD VENIPUNCTURE: CPT | Performed by: INTERNAL MEDICINE

## 2024-11-25 ENCOUNTER — OFFICE VISIT (OUTPATIENT)
Dept: CARDIOLOGY | Age: 64
End: 2024-11-25
Attending: INTERNAL MEDICINE

## 2024-11-25 VITALS
HEIGHT: 70 IN | SYSTOLIC BLOOD PRESSURE: 128 MMHG | HEART RATE: 59 BPM | WEIGHT: 154.1 LBS | OXYGEN SATURATION: 100 % | DIASTOLIC BLOOD PRESSURE: 71 MMHG | BODY MASS INDEX: 22.06 KG/M2

## 2024-11-25 DIAGNOSIS — E78.41 ELEVATED LP(A): ICD-10-CM

## 2024-11-25 DIAGNOSIS — I49.3 PVC (PREMATURE VENTRICULAR CONTRACTION): ICD-10-CM

## 2024-11-25 DIAGNOSIS — R00.2 PALPITATIONS: ICD-10-CM

## 2024-11-25 DIAGNOSIS — I34.0 NONRHEUMATIC MITRAL VALVE REGURGITATION: Primary | ICD-10-CM

## 2024-11-25 DIAGNOSIS — I21.4 NSTEMI (NON-ST ELEVATED MYOCARDIAL INFARCTION)  (CMD): ICD-10-CM

## 2024-11-25 PROCEDURE — 99215 OFFICE O/P EST HI 40 MIN: CPT | Performed by: INTERNAL MEDICINE

## 2024-12-04 ENCOUNTER — TELEPHONE (OUTPATIENT)
Dept: CARDIOLOGY | Age: 64
End: 2024-12-04

## 2025-01-22 ENCOUNTER — E-ADVICE (OUTPATIENT)
Dept: CARDIOLOGY | Age: 65
End: 2025-01-22

## 2025-01-29 ENCOUNTER — TELEPHONE (OUTPATIENT)
Dept: SCHEDULING | Age: 65
End: 2025-01-29

## 2025-01-29 ENCOUNTER — TELEPHONE (OUTPATIENT)
Dept: CARDIOLOGY | Age: 65
End: 2025-01-29

## 2025-02-06 ENCOUNTER — LAB SERVICES (OUTPATIENT)
Dept: LAB | Age: 65
End: 2025-02-06

## 2025-02-06 DIAGNOSIS — I49.3 PVC (PREMATURE VENTRICULAR CONTRACTION): ICD-10-CM

## 2025-02-06 DIAGNOSIS — E78.41 ELEVATED LP(A): ICD-10-CM

## 2025-02-06 DIAGNOSIS — I34.0 NONRHEUMATIC MITRAL VALVE REGURGITATION: ICD-10-CM

## 2025-02-06 DIAGNOSIS — R00.2 PALPITATIONS: ICD-10-CM

## 2025-02-06 DIAGNOSIS — I21.4 NSTEMI (NON-ST ELEVATED MYOCARDIAL INFARCTION)  (CMD): ICD-10-CM

## 2025-02-06 LAB
BASOPHILS # BLD: 0.1 K/MCL (ref 0–0.3)
BASOPHILS NFR BLD: 1 %
DEPRECATED RDW RBC: 41.6 FL (ref 39–50)
EOSINOPHIL # BLD: 0.2 K/MCL (ref 0–0.5)
EOSINOPHIL NFR BLD: 4 %
ERYTHROCYTE [DISTWIDTH] IN BLOOD: 11.8 % (ref 11–15)
HBA1C MFR BLD: 5.7 % (ref 4.5–5.6)
HCT VFR BLD CALC: 41.4 % (ref 36–46.5)
HGB BLD-MCNC: 13.9 G/DL (ref 12–15.5)
IMM GRANULOCYTES # BLD AUTO: 0 K/MCL (ref 0–0.2)
IMM GRANULOCYTES # BLD: 0 %
LYMPHOCYTES # BLD: 1.5 K/MCL (ref 1–4)
LYMPHOCYTES NFR BLD: 28 %
MCH RBC QN AUTO: 32.5 PG (ref 26–34)
MCHC RBC AUTO-ENTMCNC: 33.6 G/DL (ref 32–36.5)
MCV RBC AUTO: 96.7 FL (ref 78–100)
MONOCYTES # BLD: 0.4 K/MCL (ref 0.3–0.9)
MONOCYTES NFR BLD: 7 %
NEUTROPHILS # BLD: 3.1 K/MCL (ref 1.8–7.7)
NEUTROPHILS NFR BLD: 60 %
NRBC BLD MANUAL-RTO: 0 /100 WBC
PLATELET # BLD AUTO: 182 K/MCL (ref 140–450)
RBC # BLD: 4.28 MIL/MCL (ref 4–5.2)
WBC # BLD: 5.2 K/MCL (ref 4.2–11)

## 2025-02-06 PROCEDURE — 36415 COLL VENOUS BLD VENIPUNCTURE: CPT | Performed by: INTERNAL MEDICINE

## 2025-02-06 PROCEDURE — 80061 LIPID PANEL: CPT | Performed by: CLINICAL MEDICAL LABORATORY

## 2025-02-06 PROCEDURE — 85025 COMPLETE CBC W/AUTO DIFF WBC: CPT | Performed by: CLINICAL MEDICAL LABORATORY

## 2025-02-06 PROCEDURE — 82306 VITAMIN D 25 HYDROXY: CPT | Performed by: CLINICAL MEDICAL LABORATORY

## 2025-02-06 PROCEDURE — 80053 COMPREHEN METABOLIC PANEL: CPT | Performed by: CLINICAL MEDICAL LABORATORY

## 2025-02-06 PROCEDURE — 83036 HEMOGLOBIN GLYCOSYLATED A1C: CPT | Performed by: CLINICAL MEDICAL LABORATORY

## 2025-02-07 LAB
25(OH)D3+25(OH)D2 SERPL-MCNC: 34.2 NG/ML (ref 30–100)
ALBUMIN SERPL-MCNC: 4.6 G/DL (ref 3.4–5)
ALBUMIN/GLOB SERPL: 1.9 {RATIO} (ref 1–2.4)
ALP SERPL-CCNC: 71 UNITS/L (ref 45–117)
ALT SERPL-CCNC: 40 UNITS/L
ANION GAP SERPL CALC-SCNC: 12 MMOL/L (ref 7–19)
AST SERPL-CCNC: 21 UNITS/L
BILIRUB SERPL-MCNC: 0.6 MG/DL (ref 0.2–1)
BUN SERPL-MCNC: 16 MG/DL (ref 6–20)
BUN/CREAT SERPL: 19 (ref 7–25)
CALCIUM SERPL-MCNC: 9.5 MG/DL (ref 8.4–10.2)
CHLORIDE SERPL-SCNC: 106 MMOL/L (ref 97–110)
CHOLEST SERPL-MCNC: 133 MG/DL
CHOLEST/HDLC SERPL: 1.9 {RATIO}
CO2 SERPL-SCNC: 25 MMOL/L (ref 21–32)
CREAT SERPL-MCNC: 0.83 MG/DL (ref 0.51–0.95)
EGFRCR SERPLBLD CKD-EPI 2021: 79 ML/MIN/{1.73_M2}
FASTING DURATION TIME PATIENT: 20 HOURS (ref 0–999)
GLOBULIN SER-MCNC: 2.4 G/DL (ref 2–4)
GLUCOSE SERPL-MCNC: 91 MG/DL (ref 70–99)
HDLC SERPL-MCNC: 69 MG/DL
LDLC SERPL CALC-MCNC: 53 MG/DL
NONHDLC SERPL-MCNC: 64 MG/DL
POTASSIUM SERPL-SCNC: 3.9 MMOL/L (ref 3.4–5.1)
PROT SERPL-MCNC: 7 G/DL (ref 6.4–8.2)
SODIUM SERPL-SCNC: 139 MMOL/L (ref 135–145)
TRIGL SERPL-MCNC: 53 MG/DL

## 2025-02-27 RX ORDER — LOSARTAN POTASSIUM 25 MG/1
25 TABLET ORAL NIGHTLY
Qty: 90 TABLET | Refills: 0 | Status: SHIPPED | OUTPATIENT
Start: 2025-02-27

## 2025-03-11 ENCOUNTER — E-ADVICE (OUTPATIENT)
Dept: CARDIOLOGY | Age: 65
End: 2025-03-11

## 2025-03-14 RX ORDER — LOSARTAN POTASSIUM 25 MG/1
25 TABLET ORAL NIGHTLY
Qty: 90 TABLET | Refills: 0 | Status: SHIPPED | OUTPATIENT
Start: 2025-03-14

## 2025-04-14 ENCOUNTER — APPOINTMENT (OUTPATIENT)
Dept: OBGYN | Age: 65
End: 2025-04-14

## 2025-04-28 RX ORDER — ATORVASTATIN CALCIUM 10 MG/1
10 TABLET, FILM COATED ORAL DAILY
Qty: 90 TABLET | Refills: 1 | Status: SHIPPED | OUTPATIENT
Start: 2025-04-28

## 2025-04-29 ENCOUNTER — APPOINTMENT (OUTPATIENT)
Dept: CV DIAGNOSTICS | Facility: HOSPITAL | Age: 65
End: 2025-04-29
Attending: INTERNAL MEDICINE
Payer: COMMERCIAL

## 2025-04-29 ENCOUNTER — HOSPITAL ENCOUNTER (INPATIENT)
Facility: HOSPITAL | Age: 65
LOS: 1 days | Discharge: HOME | End: 2025-04-30
Attending: STUDENT IN AN ORGANIZED HEALTH CARE EDUCATION/TRAINING PROGRAM | Admitting: HOSPITALIST
Payer: COMMERCIAL

## 2025-04-29 ENCOUNTER — APPOINTMENT (OUTPATIENT)
Dept: OBGYN | Age: 65
End: 2025-04-29

## 2025-04-29 DIAGNOSIS — I47.20 VENTRICULAR TACHYCARDIA (HCC): Primary | ICD-10-CM

## 2025-04-29 LAB
ANION GAP SERPL CALC-SCNC: 11 MMOL/L (ref 0–18)
ATRIAL RATE: 62 BPM
BASOPHILS # BLD AUTO: 0.05 X10(3) UL (ref 0–0.2)
BASOPHILS NFR BLD AUTO: 1.3 %
BUN BLD-MCNC: 19 MG/DL (ref 9–23)
BUN/CREAT SERPL: 17.9 (ref 10–20)
CALCIUM BLD-MCNC: 9 MG/DL (ref 8.7–10.4)
CHLORIDE SERPL-SCNC: 106 MMOL/L (ref 98–112)
CO2 SERPL-SCNC: 24 MMOL/L (ref 21–32)
CREAT BLD-MCNC: 1.06 MG/DL (ref 0.55–1.02)
DEPRECATED RDW RBC AUTO: 46.5 FL (ref 35.1–46.3)
EGFRCR SERPLBLD CKD-EPI 2021: 59 ML/MIN/1.73M2 (ref 60–?)
EOSINOPHIL # BLD AUTO: 0.25 X10(3) UL (ref 0–0.7)
EOSINOPHIL NFR BLD AUTO: 6.3 %
ERYTHROCYTE [DISTWIDTH] IN BLOOD BY AUTOMATED COUNT: 12.9 % (ref 11–15)
EST. AVERAGE GLUCOSE BLD GHB EST-MCNC: 117 MG/DL (ref 68–126)
GLUCOSE BLD-MCNC: 275 MG/DL (ref 70–99)
HBA1C MFR BLD: 5.7 % (ref ?–5.7)
HCT VFR BLD AUTO: 40 % (ref 35–48)
HGB BLD-MCNC: 13.1 G/DL (ref 12–16)
IMM GRANULOCYTES # BLD AUTO: 0.01 X10(3) UL (ref 0–1)
IMM GRANULOCYTES NFR BLD: 0.3 %
LYMPHOCYTES # BLD AUTO: 1.27 X10(3) UL (ref 1–4)
LYMPHOCYTES NFR BLD AUTO: 32.2 %
MAGNESIUM SERPL-MCNC: 1.9 MG/DL (ref 1.6–2.6)
MCH RBC QN AUTO: 31.9 PG (ref 26–34)
MCHC RBC AUTO-ENTMCNC: 32.8 G/DL (ref 31–37)
MCV RBC AUTO: 97.3 FL (ref 80–100)
MONOCYTES # BLD AUTO: 0.37 X10(3) UL (ref 0.1–1)
MONOCYTES NFR BLD AUTO: 9.4 %
NEUTROPHILS # BLD AUTO: 1.99 X10 (3) UL (ref 1.5–7.7)
NEUTROPHILS # BLD AUTO: 1.99 X10(3) UL (ref 1.5–7.7)
NEUTROPHILS NFR BLD AUTO: 50.5 %
OSMOLALITY SERPL CALC.SUM OF ELEC: 304 MOSM/KG (ref 275–295)
P AXIS: 59 DEGREES
P-R INTERVAL: 170 MS
PHOSPHATE SERPL-MCNC: 4.3 MG/DL (ref 2.4–5.1)
PLATELET # BLD AUTO: 164 10(3)UL (ref 150–450)
POTASSIUM SERPL-SCNC: 3.7 MMOL/L (ref 3.5–5.1)
Q-T INTERVAL: 474 MS
QRS DURATION: 98 MS
QTC CALCULATION (BEZET): 481 MS
R AXIS: 28 DEGREES
RBC # BLD AUTO: 4.11 X10(6)UL (ref 3.8–5.3)
SODIUM SERPL-SCNC: 141 MMOL/L (ref 136–145)
T AXIS: 26 DEGREES
TROPONIN I SERPL HS-MCNC: 11 NG/L (ref ?–34)
TROPONIN I SERPL HS-MCNC: 11 NG/L (ref ?–34)
TROPONIN I SERPL HS-MCNC: <3 NG/L (ref ?–34)
TSI SER-ACNC: 1.8 UIU/ML (ref 0.55–4.78)
VENTRICULAR RATE: 62 BPM
WBC # BLD AUTO: 3.9 X10(3) UL (ref 4–11)

## 2025-04-29 PROCEDURE — 96366 THER/PROPH/DIAG IV INF ADDON: CPT

## 2025-04-29 PROCEDURE — 93005 ELECTROCARDIOGRAM TRACING: CPT

## 2025-04-29 PROCEDURE — 83735 ASSAY OF MAGNESIUM: CPT | Performed by: STUDENT IN AN ORGANIZED HEALTH CARE EDUCATION/TRAINING PROGRAM

## 2025-04-29 PROCEDURE — 85025 COMPLETE CBC W/AUTO DIFF WBC: CPT | Performed by: STUDENT IN AN ORGANIZED HEALTH CARE EDUCATION/TRAINING PROGRAM

## 2025-04-29 PROCEDURE — 96365 THER/PROPH/DIAG IV INF INIT: CPT

## 2025-04-29 PROCEDURE — 99285 EMERGENCY DEPT VISIT HI MDM: CPT

## 2025-04-29 PROCEDURE — 84443 ASSAY THYROID STIM HORMONE: CPT | Performed by: INTERNAL MEDICINE

## 2025-04-29 PROCEDURE — 84484 ASSAY OF TROPONIN QUANT: CPT | Performed by: INTERNAL MEDICINE

## 2025-04-29 PROCEDURE — 84100 ASSAY OF PHOSPHORUS: CPT | Performed by: STUDENT IN AN ORGANIZED HEALTH CARE EDUCATION/TRAINING PROGRAM

## 2025-04-29 PROCEDURE — 93010 ELECTROCARDIOGRAM REPORT: CPT

## 2025-04-29 PROCEDURE — 84484 ASSAY OF TROPONIN QUANT: CPT | Performed by: STUDENT IN AN ORGANIZED HEALTH CARE EDUCATION/TRAINING PROGRAM

## 2025-04-29 PROCEDURE — 83036 HEMOGLOBIN GLYCOSYLATED A1C: CPT | Performed by: HOSPITALIST

## 2025-04-29 PROCEDURE — 93306 TTE W/DOPPLER COMPLETE: CPT | Performed by: INTERNAL MEDICINE

## 2025-04-29 PROCEDURE — 80048 BASIC METABOLIC PNL TOTAL CA: CPT | Performed by: STUDENT IN AN ORGANIZED HEALTH CARE EDUCATION/TRAINING PROGRAM

## 2025-04-29 RX ORDER — HEPARIN SODIUM 5000 [USP'U]/ML
5000 INJECTION, SOLUTION INTRAVENOUS; SUBCUTANEOUS EVERY 8 HOURS SCHEDULED
Status: DISCONTINUED | OUTPATIENT
Start: 2025-04-29 | End: 2025-04-30

## 2025-04-29 RX ORDER — ATORVASTATIN CALCIUM 10 MG/1
10 TABLET, FILM COATED ORAL NIGHTLY
COMMUNITY

## 2025-04-29 RX ORDER — POTASSIUM CHLORIDE 1500 MG/1
40 TABLET, EXTENDED RELEASE ORAL ONCE
Status: COMPLETED | OUTPATIENT
Start: 2025-04-29 | End: 2025-04-29

## 2025-04-29 RX ORDER — SENNOSIDES 8.6 MG
17.2 TABLET ORAL NIGHTLY PRN
Status: DISCONTINUED | OUTPATIENT
Start: 2025-04-29 | End: 2025-04-30

## 2025-04-29 RX ORDER — ASPIRIN 81 MG/1
81 TABLET ORAL DAILY
Status: DISCONTINUED | OUTPATIENT
Start: 2025-04-30 | End: 2025-04-30

## 2025-04-29 RX ORDER — METOPROLOL SUCCINATE 25 MG/1
25 TABLET, EXTENDED RELEASE ORAL
Status: DISCONTINUED | OUTPATIENT
Start: 2025-04-30 | End: 2025-04-30

## 2025-04-29 RX ORDER — PROCHLORPERAZINE EDISYLATE 5 MG/ML
5 INJECTION INTRAMUSCULAR; INTRAVENOUS EVERY 8 HOURS PRN
Status: DISCONTINUED | OUTPATIENT
Start: 2025-04-29 | End: 2025-04-30

## 2025-04-29 RX ORDER — ATORVASTATIN CALCIUM 10 MG/1
10 TABLET, FILM COATED ORAL NIGHTLY
Status: DISCONTINUED | OUTPATIENT
Start: 2025-04-29 | End: 2025-04-30

## 2025-04-29 RX ORDER — ASPIRIN 81 MG/1
81 TABLET ORAL DAILY
COMMUNITY

## 2025-04-29 RX ORDER — ACETAMINOPHEN 500 MG
500 TABLET ORAL EVERY 4 HOURS PRN
Status: DISCONTINUED | OUTPATIENT
Start: 2025-04-29 | End: 2025-04-30

## 2025-04-29 RX ORDER — BISACODYL 10 MG
10 SUPPOSITORY, RECTAL RECTAL
Status: DISCONTINUED | OUTPATIENT
Start: 2025-04-29 | End: 2025-04-30

## 2025-04-29 RX ORDER — LOSARTAN POTASSIUM 25 MG/1
25 TABLET ORAL NIGHTLY
COMMUNITY
End: 2025-04-30

## 2025-04-29 RX ORDER — POLYETHYLENE GLYCOL 3350 17 G/17G
17 POWDER, FOR SOLUTION ORAL DAILY PRN
Status: DISCONTINUED | OUTPATIENT
Start: 2025-04-29 | End: 2025-04-30

## 2025-04-29 RX ORDER — ONDANSETRON 2 MG/ML
4 INJECTION INTRAMUSCULAR; INTRAVENOUS EVERY 6 HOURS PRN
Status: DISCONTINUED | OUTPATIENT
Start: 2025-04-29 | End: 2025-04-30

## 2025-04-29 RX ORDER — VERAPAMIL HYDROCHLORIDE 40 MG/1
40 TABLET ORAL EVERY 8 HOURS SCHEDULED
Status: DISCONTINUED | OUTPATIENT
Start: 2025-04-29 | End: 2025-04-30

## 2025-04-29 NOTE — CONSULTS
Cardiology Consultation  [facility]    Sherry Torres Patient Status:  Emergency    1960 MRN [id number]   Location [facility] EMERGENCY DEPARTMENT Attending Wiley Jensen*   Hosp Day # 0 PCP Eder White MD     Reason for Consultation:  WCT    History of Present Illness:  Sherry Torres is a a(n) 64 year old female with pmh nonobs CAD and MVP/mild-moderate MR presenting with WCT and syncope/near syncope.  Patient states she was in usual state of health this morning getting ready for work.  Began to feel diaphoretic and maybe some chest tightness.  States she felt like she was going to pass out; patient is uncertain if she lost consciousness, but daughter reports unresponsive episode and found patient on the ground holding on to sofa.  Paramedics called.  Patient noted to be in WCT, reportedly tried vagal maneuvers without cardioversion, thus defib x 1 performed.  Patient reports similar episode in [place] in 2024; [facility] at that time without significant CAD, echo and cMRI with normal LV function, mild-mod MR, no LGE.  Subsequent cardiac monitor without significant arrhythmia.    On admission patient afeb, HR 74, /72.  Cbc, bmp unremarkable, trop negative x 2.  TSH pending.  ECG with NSR.  Patient started on IV amiodarone and cardiology consulted for further eval and management.    History:  Past Medical History[1]  Past Surgical History[2]  Family History[3]   reports that she has never smoked. She has never used smokeless tobacco. She reports current alcohol use of about 3.0 standard drinks of alcohol per week. She reports that she does not use drugs.    Allergies:  Allergies[4]    Medications:  Medications Ordered Prior to Encounter[5]    Review of Systems:  Constitutional: denies fevers, chills, night sweats  HEENT: denies headache, vision changes, trouble or pain with swallowing  Cardiac: denies chest pain, palpitations, edema  Pulm: denies dyspnea, cough, wheeze  GI:  denies n/v, abd pain, diarrhea or constipation  : denies hematuria, dysuria, incontinence  MSK: denies muscle or joint pains  Neuro: + syncope/near syncope as per hpi  Psych: denies anxiety, depression  Integument: denies skin rashes or lesions  Heme: denies easy bruising or bleeding  Endo: denies heat/cold intolerance, skin or nail changes      Physical Exam:  Blood pressure 113/52, pulse 55, resp. rate 21, height 5' 10\" (1.778 m), weight 157 lb (71.2 kg), SpO2 97%.  Wt Readings from Last 3 Encounters:   04/29/25 157 lb (71.2 kg)   11/03/22 150 lb (68 kg)   10/24/22 150 lb (68 kg)       General: awake, alert, oriented x 3, no acute distress  HEENT: at/nc, perrl, eomi  Neck: No JVD, carotids 2+ no bruits.  Cardiac: Regular rate and rhythm, S1, S2 normal, no murmur, rub or gallop.  Lungs: Clear without wheezes, rales, rhonchi or dullness.  Normal excursions and effort.  Abdomen: Soft, non-tender, non-distended, normal bowel sounds   Extremities: Without clubbing, cyanosis or edema.  Peripheral pulses are 2+.  Neurologic: Alert and oriented, normal affect.  Psych: normal mood and affect  Skin: Warm and dry.       Laboratories and Data:  Diagnostics:  EKG: NSR    CMRI 10/2024:  1.   Normal left ventricular size and function with a normal calculated   ejection fraction.   2. Normal right ventricular size and function.   3. Bileaflet mitral valve prolapse with mild to moderate mitral   regurgitation.   4. No evidence of scar/fibrosis on late gadolinium enhancement imaging.     [facility] 9/2024:  Diagnostic Procedure Summary   Very mild CAD - not obstructive     Labs:   CBC:    Lab Results   Component Value Date    WBC 3.9 (L) 04/29/2025     Lab Results   Component Value Date    HGB 13.1 04/29/2025      Lab Results   Component Value Date    .0 04/29/2025     BMP:   No results found for: \"GLUCOSE\"  Lab Results   Component Value Date    K 3.7 04/29/2025     Lab Results   Component Value Date    BUN 19 04/29/2025     Lab  Results   Component Value Date    CREATSERUM 1.06 (H) 04/29/2025     Cholesterol:     Lab Results   Component Value Date    CHOLEST 203 (A) 11/03/2022     Lab Results   Component Value Date    HDL 48 11/03/2022     Lab Results   Component Value Date    TRIG 196 (A) 11/03/2022     Lab Results   Component Value Date     (A) 11/03/2022   No results found for: \"AST\"No results found for: \"ALT\"    Assessment/Plan:  64 year old female presenting with:    1) WCT s/p defib x 1 with restoration of sinus rhythm  2) Syncope/near syncope, 2/2 above  3) Nonobs CAD on [facility] 9/2024  4) MVP with mild-moderate MR ([facility] 10/2024 with normal EF, no LGE)    - ECG on admission with NSR, no ischemia or arrhythmia; trop negative x 2  - Cardiac testing from similar episode 9/2024 reviewed; repeat echo ordered and pending  - Cont IV amiodarone for now  - Cont asa, statin, bb, ace-I  - EP eval  - Monitor on tele  - Will follow    Dejuan Wren MD  Interventional cardiologist, [facility]  4/29/2025  12:14 PM         [1]   Past Medical History:   Basal cell cancer    chest    Skin cancer   [2]   Past Surgical History:  Procedure Laterality Date    Other surgical history Right 2012    ACL repair   [3]   Family History  Problem Relation Age of Onset    Heart Disorder Sister     Other (arotic aneurysm) Father    [4] No Known Allergies  [5]   No current facility-administered medications on file prior to encounter.     Current Outpatient Medications on File Prior to Encounter   Medication Sig Dispense Refill    lisinopril 2.5 MG Oral Tab       LORazepam 0.5 MG Oral Tab Take 1 tablet (0.5 mg total) by mouth 2 (two) times daily as needed for Anxiety. 10 tablet 0    Metoprolol Succinate ER 25 MG Oral Tablet 24 Hr Take 1 tablet (25 mg total) by mouth daily. 90 tablet 3    Multiple Vitamin (MULTI VITAMIN DAILY) Oral Tab Take by mouth.      Melatonin Does not apply Powder Take by mouth.      Calcium Carbonate-Vitamin D (CALCIUM + D OR) Take  by mouth daily.        Omega-3 Fatty Acids (FISH OIL BURP-LESS OR) Take by mouth daily.        Fluticasone Propionate 50 MCG/ACT Nasal Suspension 2 sprays by Each Nare route daily. (Patient not taking: No sig reported) 1 Bottle 0

## 2025-04-29 NOTE — ED QUICK NOTES
Rounding Completed    Plan of Care reviewed. Waiting for admission .  Elimination needs assessed.    Bed is locked and in lowest position. Call light within reach.

## 2025-04-29 NOTE — PROGRESS NOTES
Received patient from ED to PCCU 212. Report received from Amarjit Mejia. Medications, labs, plan of care reviewed. Skin assessment on arrival to unit performed with Ricki CASTRO.     Wounds are as follows: none. Skin intact.    Additional notifications/statements include: VSS. NSR. No c/o pain.

## 2025-04-29 NOTE — ED QUICK NOTES
Orders for admission, patient is aware of plan and ready to go upstairs. Any questions, please call ED RN kavin at extension [phone].     Patient Covid vaccination status: Fully vaccinated     COVID Test Ordered in ED: None    COVID Suspicion at Admission: N/A    Running Infusions: Medication Infusions[1]     Mental Status/LOC at time of transport: a&ox4    Other pertinent information:   CIWA score: N/A   NIH score:  N/A             [1]    amiodarone 1 mg/min (04/29/25 0906)    Followed by    amiodarone

## 2025-04-29 NOTE — CONSULTS
[facility] CONSULT  Ren Garcia MD  ?  Patient: Sherry Torres  MRN: [id number]     Primary Care Physician: Dr. Eder White MD  Referring Physician : Wiley Granados*  Reason for Consultation: Ventricular tachycardia     HPI:  Sherry Torres is a 64-year-old female with a history of suspected prior VTE who presented to the emergency room earlier today following an intense palpitations.  EMS was summoned and patient was found to have a monomorphic wide-complex tachycardia with hypotension and subsequently defibrillated in the field.    In the emergency room, blood pressure was 140/72 with a heart rate of 74.  Saturations were stable on room air.  ECG showed sinus rhythm with rate of 62.  No high risk features were identified.    Initial troponin was negative.  Potassium was 3.7 and magnesium 1.9 with creatinine 1.06.    The patient is seen in the emergency room.  She states that she had woken up in her usual state of health when all of a sudden she developed intense palpitations and a feeling as though she would pass out.  She checked her heart rate on her personal monitor and noted to be in excess of 200 bpm.  EMS was summoned and at that point, she was found to have a monomorphic wide-complex tachycardia with rate of 212 bpm with subsequent external defibrillation.  The patient states that while she felt near syncopal, she did not lose consciousness and can recall experiencing the shock.      She does recall having somewhat of a similar symptomatic episode back in 9/24.  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  She did seek medical attention upon reaching sure however symptoms had mostly subsided by then.    She does not smoke but does drink alcohol socially.  Family history is notable for sister who had a spontaneous carotid artery dissection.  She is currently employed and  works for [employer] in the [employer] department.    ----------------------------------------------------------  A/P:  Sherry Torres is a 60-year-old female with a history of symptomatic wide-complex tachycardia.    Hypertension  -Lisinopril 2.5 and Toprol 25    Wide-complex tachycardia  -Monomorphic WCT with rate 212 bpm noted with subsequent defibrillation in the field   -ECG on admission with sinus rate of 62 and without high risk features identified.  -Cardiac monitor 11/24 with sinus average 64 and longest run of NSVT 4 beats in duration (no evidence of sustained ventricular arrhythmias)  -Echo 9/24 with preserved LV systolic function without significant valvular normality  -LHC reportedly negative (results unavailable for review)  -Cardiac MRI 10/24 with preserved LV systolic function and without evidence of LGE/fibrosis  -Electrolytes stable   -currently on amiodarone    To summarize, the patient has previously undergone cardiac workup including cardiac catheterization, echocardiogram, and cardiac MRI all of which were relatively unrevealing.  The patient may potentially have idiopathic VT and otherwise structurally normal heart.  Outflow tract VT may be a possibility however diagnosis would be challenging given single-lead tracing available for review.     Summary recommendations  1.  Continue to monitor on telemetry  2.  Discontinue amiodarone  3.  Continue Toprol 25 daily  4.  Add verapamil 40 tid (can hold lisinopril)  5.  Consider exercise treadmill study for evaluation of any exercise-induced arrhythmias  6.  ICD may be indicated however will need further workup    Thank you for allowing me to participate in the care of your patient.    MD Tracy [facility]    -----------------------------------------------------------------------------      Past Medical History[1]  Active Problems:    Ventricular tachycardia (HCC)          Medications:  .Medications Ordered Prior to  Encounter[2]  Allergies: Allergies[3]  Social History     Socioeconomic History    Marital status:      Spouse name: Not on file    Number of children: Not on file    Years of education: Not on file    Highest education level: Not on file   Occupational History    Not on file   Tobacco Use    Smoking status: Never    Smokeless tobacco: Never   Substance and Sexual Activity    Alcohol use: Yes     Alcohol/week: 3.0 standard drinks of alcohol     Types: 3 Glasses of wine per week     Comment: social    Drug use: Never    Sexual activity: Not on file   Other Topics Concern    Grew up on a farm Not Asked    History of tanning Not Asked    Outdoor occupation Not Asked    Pt has a pacemaker No    Pt has a defibrillator No    Breast feeding Not Asked    Reaction to local anesthetic No   Social History Narrative    Not on file     Social Drivers of Health     Food Insecurity: Low Risk  (4/27/2025)    Received from [facility]    Food Insecurity     Within the past 12 months, you worried that your food would run out before you got money to buy more.  : Never true     Within the past 12 months, the food you bought just didn't last and you didn't have money to get more. : Never true   Transportation Needs: Not At Risk (4/27/2025)    Received from [facility]    Transportation Needs     In the past 12 months, has lack of reliable transportation kept you from medical appointments, meetings, work or from getting things needed for daily living? : No   Housing Stability: Unknown (9/21/2024)    [facility]    Housing Stability Vital Sign     Unable to Pay for Housing in the Last Year: Not on file     Number of Times Moved in the Last Year: Not on file     At any time in the past 12 months, were you homeless or living in a shelter (including now)?: No     Family History[4]  SH and FH were reviewed and updated.     Review of Systems  Constitutional: negative for fatigue, negative for change  in appetite, chills, fevers, sweats (diaphoresis), weight loss or weight gain     Eyes: negative for irritation, redness and visual disturbance     Ears, nose, mouth, throat, and face: negative for hearing loss and sinus problems; negative for dental problems, negative for epistaxis, nasal congestion and sore throat     Respiratory: negative for cough, hemoptysis, pleurisy/chest pain, sputum and wheezing; negative for dyspnea upon exertion     Cardiovascular: See HPI     Gastrointestinal: negative for vomiting, nausea, heartburn(sensation) and abdominal bloating. Negative for diarrhea, constipation or melena     Genitourinary:negative for dysuria and hematuria     Integumentary: negative for rash and skin lesion(s)     Hematologic/lymphatic: negative for bleeding,easy bruising and lymphadenopathy     Musculoskeletal:negative for back pain, myalgias and muscle cramps/weakness     Neurological: negative for gait/coordination problems, headaches, memory problems, seizure, vertigo, dizziness and lightheadedness     Behavioral/Psych: negative for anxiety and depression     Endocrine: negative for diabetic symptoms including blurry vision, increased fatigue, polydipsia and polyuria and temperature intolerance     Allergic/Immunologic: negative for anaphylaxis, hay fever and pruritus     Physical Exam:  Blood pressure 113/74, pulse 56, resp. rate 17, height 5' 10\" (1.778 m), weight 157 lb (71.2 kg), SpO2 95%.  ?  Alert and Oriented times x 3, pleasant, no distress, conversant, appears stated age  PER, Anicteric sclerae, moist conjunctivae, no eye lid lag present, EOM's intact  Lips, mucosa, and tongue normal; teeth and gums normal,  Trachea midline, no thyromegaly, no cervical lymphadenopathy  No JVD or No carotid bruits  Lungs are CTAB, no wheezes or crackles, normal respiratory effort  Heart exam is regular, no murmurs or S3, PMI is non-displaced  Abdomen is soft, nontender, bowel sounds are present, no HSM, no  bruits  Lower extremities without edema, no clubbing  1+ pedal and femoral pulses bilaterally  Normal skin turgor, texture, no rashes or lesions?    LABS:  .  Lab Results   Component Value Date    WBC 3.9 (L) 04/29/2025    HGB 13.1 04/29/2025    HCT 40.0 04/29/2025    MCV 97.3 04/29/2025    .0 04/29/2025     .  Lab Results   Component Value Date    BUN 19 04/29/2025     04/29/2025    K 3.7 04/29/2025     04/29/2025    CO2 24.0 04/29/2025     .No results found for: \"INR\", \"PROTIME\"  .No components found for: \"CHLPL\"  Lab Results   Component Value Date    HDL 48 11/03/2022     Lab Results   Component Value Date     (A) 11/03/2022     Lab Results   Component Value Date    TRIG 196 (A) 11/03/2022     No results found for: \"CHOLHDL\"  .No results found for: \"ALT\", \"AST\", \"GGT\", \"ALKPHOS\", \"BILITOT\"  .No results found for: \"TSH\"       Thank you for allowing me to participate in the care of your patient.    Ren Garcia MD  [facility]    -----------------------------------------------------------------------------         [1]   Past Medical History:   Basal cell cancer    chest    Skin cancer   [2]   No current facility-administered medications on file prior to encounter.     Current Outpatient Medications on File Prior to Encounter   Medication Sig Dispense Refill    lisinopril 2.5 MG Oral Tab       LORazepam 0.5 MG Oral Tab Take 1 tablet (0.5 mg total) by mouth 2 (two) times daily as needed for Anxiety. 10 tablet 0    Metoprolol Succinate ER 25 MG Oral Tablet 24 Hr Take 1 tablet (25 mg total) by mouth daily. 90 tablet 3    Multiple Vitamin (MULTI VITAMIN DAILY) Oral Tab Take by mouth.      Melatonin Does not apply Powder Take by mouth.      Calcium Carbonate-Vitamin D (CALCIUM + D OR) Take by mouth daily.        Omega-3 Fatty Acids (FISH OIL BURP-LESS OR) Take by mouth daily.        Fluticasone Propionate 50 MCG/ACT Nasal Suspension 2 sprays by Each Nare route daily. (Patient  not taking: No sig reported) 1 Bottle 0   [3] No Known Allergies  [4]   Family History  Problem Relation Age of Onset    Heart Disorder Sister     Other (arotic aneurysm) Father

## 2025-04-29 NOTE — H&P
[facility]  part of [facility]     [facility] Hospitalist H&P     CC:   Chief Complaint   Patient presents with    Arrythmia/Palpitations        PCP: dEer White MD      Assessment and Plan     Sherry Torres is a 64 year old female with PMH sig for nonobstructive CAD and mitral prolapse with mild to moderate MR presenting with syncopal versus near syncopal event while at an outpatient doctors appointment.  EMS was called and noted to be in wide-complex tachycardia without improvement with vagal maneuver and defib x 1.  Back in normal sinus rhythm in the ER.  She had a similar event in September 2024 with workup including left heart cath, echo, cardiac MRI.      Syncope versus presyncope with wide-complex tachycardia noted in field  - Did not improve with vagal maneuver and defibrillated x 1  - Back in normal sinus rhythm in the ER and has maintained  - [place] workup including left heart cath (nonobstructive disease), echo and cardiac MRI  - Plan for EP evaluation on 4/30/2025 per discussion with cardiology, echo completed, follow-up report    Elevated glucose  - A1c is 5.7, monitor    FEN: no IVF, lytes in Am. Cardiac diet    PPX: Heparin subcu    Dispo full code, pending course    Outpatient records reviewed confirming patient's medical history and medications.     Further recommendations pending patient's clinical course.  [facility] hospitalist to continue to follow patient while in house    Patient and/or patient's family given opportunity to ask questions and note understanding and agreeing with therapeutic plan as outlined    Note: This chart was prepared using voice recognition software and may contain unintended word substitution errors.       Thank You,  Justina French MD  [facility] Hospitalist     Answering Service number: [phone]    Lists of [facility] in the [place]     Sherry Torres is a 64 year old female with PMH sig for nonobstructive CAD and mitral  prolapse with mild to moderate MR presenting with syncopal versus near syncopal event while at her daughter's apartment getting ready a doctor's appointment (annual gyne).  EMS was called and noted to be in wide-complex tachycardia without improvement with vagal maneuver and defib x 1.  Back in normal sinus rhythm in the ER.  She had a similar event in September 2024 with workup including left heart cath, echo, cardiac MRI.      Now back in normal sinus rhythm.  No hypoxia noted..  Amiodarone was started in the ER but now stopped.  Metoprolol resumed along with verapamil.  Rate currently in the 50s to 60s range.  98% on room air afebrile.    Discussed with daughter.  She states she closed her eyes and put her head back and was unresponsive for about 2 minutes.  Felt like her body was limp.  Did not fall to the ground.  Then she was more responsive but daughter feels she does not really remember the few minutes after that.  Then returned back to her normal self.  Does not think that she saw EP during prior workup.    Review of Systems  12 point systems reviewed, please see HPI for pertinent positives, otherwise negative    History     PMH  Past Medical History[1]     PSH  Past Surgical History[2]     ALL:  Allergies[3]     Home Medications:  Medications Taking[4]    Soc Hx  Social History     Tobacco Use    Smoking status: Never    Smokeless tobacco: Never   Substance Use Topics    Alcohol use: Yes     Alcohol/week: 3.0 standard drinks of alcohol     Types: 3 Glasses of wine per week     Comment: social        Fam Hx  Family History[5]        Objective     Temp: 97.5 °F (36.4 °C)  Pulse: 52  Resp: 17  BP: 113/67    Exam  Gen: No acute distress, alert and oriented x3  Neck Supple, no JVD  Pulm: Lungs clear, normal respiratory effort, No wheezing or crackles  CV: Heart with regular rate and rhythm, No murmurs, rubs, gallops  Abd: Abdomen soft, nontender, nondistended, no organomegaly, bowel sounds present  MSK: Full  range of motion in extremities, no clubbing, no cyanosis.  No Lower extremity edema  Skin: no rashes or lesions, well perfused  Psych: mood stable, cooperative  Neuro: No focal deficits    Diagnostic Data:    CBC/Chem  Recent Labs   Lab 04/29/25  0800   WBC 3.9*   HGB 13.1   MCV 97.3   .0       Recent Labs   Lab 04/29/25  0800      K 3.7      CO2 24.0   BUN 19   CREATSERUM 1.06*   *   CA 9.0   MG 1.9   PHOS 4.3       No results for input(s): \"ALT\", \"AST\", \"ALB\", \"AMYLASE\", \"LIPASE\", \"LDH\" in the last 168 hours.    Invalid input(s): \"ALPHOS\", \"TBIL\", \"DBIL\", \"TPROT\"    No results for input(s): \"TROP\" in the last 168 hours.    Additional Diagnostics: ECG:  NSR, prior strip concerning for VT     Radiology: No results found.              [1]   Past Medical History:   Basal cell cancer    chest    Skin cancer   [2]   Past Surgical History:  Procedure Laterality Date    Other surgical history Right 2012    ACL repair   [3] No Known Allergies  [4]   Outpatient Medications Marked as Taking for the 4/29/25 encounter (Hospital Encounter)   Medication Sig Dispense Refill    losartan 25 MG Oral Tab Take 1 tablet (25 mg total) by mouth at bedtime.      aspirin 81 MG Oral Tab EC Take 1 tablet (81 mg total) by mouth daily.      atorvastatin 10 MG Oral Tab Take 1 tablet (10 mg total) by mouth nightly.      Magnesium Glycinate 120 MG Oral Cap Take 2 capsules by mouth at bedtime. For sleep      Metoprolol Succinate ER 25 MG Oral Tablet 24 Hr Take 1 tablet (25 mg total) by mouth in the morning and 1 tablet (25 mg total) before bedtime. 90 tablet 3    Multiple Vitamin (MULTI VITAMIN DAILY) Oral Tab Take 1 tablet by mouth daily.      Calcium Carbonate-Vitamin D (CALCIUM + D OR) Take by mouth in the morning.      Omega-3 Fatty Acids (FISH OIL BURP-LESS OR) Take by mouth in the morning.     [5]   Family History  Problem Relation Age of Onset    Heart Disorder Sister     Other (arotic aneurysm) Father

## 2025-04-29 NOTE — ED PROVIDER NOTES
Patient Seen in: [facility] Emergency Department      History     Chief Complaint   Patient presents with    Arrythmia/Palpitations     Stated Complaint: vtach    Subjective:   HPI    64-year-old female brought in by EMS due to arrhythmia.  She states that she woke up this morning she was in her normal state of health, she is getting ready go to a gynecologist appointment as well palpitations lightheadedness.  Per daughters at bedside she was poorly responsive, was slumped over in a chair.  On EMS arrival patient monomorphic tachycardia which appeared wide-complex with low blood pressure.  Was cardioverted at 100 J into sinus rhythm.  Upon arrival to the ER she stable symptoms have resolved.  She did recall some chest tightness during the event.  She states had a similar episode when she was in [place] 2024, by the time she got to the hospital the palpitations had resolved.  She had workup that was significant for minimally elevated troponin, and so she was transferred to other hospital in [place] where she underwent cardiac workup including an angiogram which showed nonobstructive disease.  She subsequently followed up with cardiology was had cardiac MRI.  She is on beta-blocker and statin.  There is no family history of arrhythmia or sudden cardiac death there is a family history of scad.     History of Present Illness               Objective:     Past Medical History:    Basal cell cancer    chest    Skin cancer              Past Surgical History:   Procedure Laterality Date    Other surgical history Right 2012    ACL repair                Social History     Socioeconomic History    Marital status:    Tobacco Use    Smoking status: Never    Smokeless tobacco: Never   Substance and Sexual Activity    Alcohol use: Yes     Alcohol/week: 3.0 standard drinks of alcohol     Types: 3 Glasses of wine per week     Comment: social    Drug use: Never   Other Topics Concern    Pt has a pacemaker No    Pt  has a defibrillator No    Reaction to local anesthetic No     Social Drivers of Health     Food Insecurity: Low Risk  (4/27/2025)    Received from [facility]    Food Insecurity     Within the past 12 months, you worried that your food would run out before you got money to buy more.  : Never true     Within the past 12 months, the food you bought just didn't last and you didn't have money to get more. : Never true   Transportation Needs: Not At Risk (4/27/2025)    Received from [facility]    Transportation Needs     In the past 12 months, has lack of reliable transportation kept you from medical appointments, meetings, work or from getting things needed for daily living? : No   Housing Stability: Unknown (9/21/2024)    Received from [facility]    Housing Stability Vital Sign     At any time in the past 12 months, were you homeless or living in a shelter (including now)?: No                                Physical Exam     ED Triage Vitals   BP 04/29/25 0759 140/72   Pulse 04/29/25 0758 74   Resp 04/29/25 0758 19   Temp --    Temp src --    SpO2 04/29/25 0759 95 %   O2 Device 04/29/25 0759 Simple mask       Current Vitals:   Vital Signs  BP: 107/72  Pulse: 60  Resp: 22  MAP (mmHg): 79    Oxygen Therapy  SpO2: 96 %  O2 Device: None (Room air)        Physical Exam  Constitutional: awake, alert, no sig distress  HENT: mmm, no lesions,  Neck: normal range of motion, no tenderness, supple.  Eyes: PERRL, EOMI, conjunctiva normal, no discharge. Sclera anicteric.  Cardiovascular: rr no murmur  Respiratory: Normal breath sounds, no respiratory distress, no wheezing, no chest tenderness.  GI: Bowel sounds normal, Soft, no tenderness, no masses, no pulsatile masses.  : No CVA tenderness.  Skin: Warm, dry, no erythema, no rash.  Musculoskeletal: Intact distal pulses, no edema, no tenderness, no cyanosis, no clubbing. Good range of motion in all major joints. No tenderness to palpation or major  deformities noted. Back- No tenderness.  Neurologic: Alert & oriented x 3, normal motor function, normal sensory function, no focal deficits noted.  Psych: Calm, cooperative, nl affect      Physical Exam                ED Course     Labs Reviewed   BASIC METABOLIC PANEL (8) - Abnormal; Notable for the following components:       Result Value    Glucose 275 (*)     Creatinine 1.06 (*)     Calculated Osmolality 304 (*)     eGFR-Cr 59 (*)     All other components within normal limits   MAGNESIUM - Normal   PHOSPHORUS - Normal   TROPONIN I HIGH SENSITIVITY - Normal   CBC WITH DIFFERENTIAL WITH PLATELET   RAINBOW DRAW LAVENDER   RAINBOW DRAW LIGHT GREEN   RAINBOW DRAW BLUE   RAINBOW DRAW GOLD     EKG    Rate, intervals and axes as noted on EKG Report.  Rate: 62  Rhythm: Sinus Rhythm  Reading: NSR, normal axis/intervals, no st or t wave changes, no stemi                 Results            Cardiac monitor interpreted by me which shows sinus rhythm with PVCs.                     MDM      64-year-old female history as above presenting with narrow complex tachycardia associated with hemodynamic instability, addressed in prehospital setting via cardioversion at 100 J.  On arrival vital stable reassuring.  Patient asymptomatic with sinus rhythm on the monitor.  Reviewed EMS strips, but does appear to show wide-complex tachycardia -VT versus SVT with aberrancy.   Plan for cardiology consultation, admission.  Will start amiodarone.  Discussed with hospitalist and cardiology.    Admission disposition: 4/29/2025  8:34 AM           Medical Decision Making      Disposition and Plan     Clinical Impression:  1. Ventricular tachycardia (HCC)         Disposition:  Admit  4/29/2025  8:34 am    Follow-up:  No follow-up provider specified.        Medications Prescribed:  Current Discharge Medication List          Supplementary Documentation:         Hospital Problems       Present on Admission  Date Reviewed: 10/24/2022          ICD-10-CM  Noted POA    Ventricular tachycardia (HCC) I47.20 4/29/2025 Unknown

## 2025-04-29 NOTE — ED INITIAL ASSESSMENT (HPI)
Patient presents with vtach/pulse. EMS en route shocked, no amiodarone given. Systolic 126 en route.

## 2025-04-29 NOTE — PLAN OF CARE
Patient A&Ox4. VSS. NSR with PVCs on monitor. Denies any pain. Up to bathroom, good appetite. See assessments. Will monitor.    Problem: Patient Centered Care  Goal: Patient preferences are identified and integrated in the patient's plan of care  Description: Interventions:- What would you like us to know as we care for you? Lives at home with dtr  - Provide timely, complete, and accurate information to patient/family- Incorporate patient and family knowledge, values, beliefs, and cultural backgrounds into the planning and delivery of care- Encourage patient/family to participate in care and decision-making at the level they choose- Honor patient and family perspectives and choices  Outcome: Progressing     Problem: Patient/Family Goals  Goal: Patient/Family Long Term Goal  Description: Patient's Long Term Goal: to return home  Interventions:  - cardiac monitoring  -EP eval  - See additional Care Plan goals for specific interventions  Outcome: Progressing  Goal: Patient/Family Short Term Goal  Description: Patient's Short Term Goal: cardiac stability  Interventions:   - cardiac monitoring  Med adjustement per EP  - See additional Care Plan goals for specific interventions  Outcome: Progressing     Problem: CARDIOVASCULAR - ADULT  Goal: Maintains optimal cardiac output and hemodynamic stability  Description: INTERVENTIONS:- Monitor vital signs, rhythm, and trends- Monitor for bleeding, hypotension and signs of decreased cardiac output- Evaluate effectiveness of vasoactive medications to optimize hemodynamic stability- Monitor arterial and/or venous puncture sites for bleeding and/or hematoma- Assess quality of pulses, skin color and temperature- Assess for signs of decreased coronary artery perfusion - ex. Angina- Evaluate fluid balance, assess for edema, trend weights  Outcome: Progressing  Goal: Absence of cardiac arrhythmias or at baseline  Description: INTERVENTIONS:- Continuous cardiac monitoring, monitor vital  signs, obtain 12 lead EKG if indicated- Evaluate effectiveness of antiarrhythmic and heart rate control medications as ordered- Initiate emergency measures for life threatening arrhythmias- Monitor electrolytes and administer replacement therapy as ordered  Outcome: Progressing

## 2025-04-30 ENCOUNTER — APPOINTMENT (OUTPATIENT)
Dept: CV DIAGNOSTICS | Facility: HOSPITAL | Age: 65
End: 2025-04-30
Attending: INTERNAL MEDICINE
Payer: COMMERCIAL

## 2025-04-30 VITALS
TEMPERATURE: 99 F | HEART RATE: 49 BPM | DIASTOLIC BLOOD PRESSURE: 62 MMHG | BODY MASS INDEX: 23.29 KG/M2 | OXYGEN SATURATION: 95 % | RESPIRATION RATE: 15 BRPM | WEIGHT: 162.69 LBS | HEIGHT: 70 IN | SYSTOLIC BLOOD PRESSURE: 111 MMHG

## 2025-04-30 LAB
ANION GAP SERPL CALC-SCNC: 8 MMOL/L (ref 0–18)
BASOPHILS # BLD AUTO: 0.05 X10(3) UL (ref 0–0.2)
BASOPHILS NFR BLD AUTO: 1.1 %
BUN BLD-MCNC: 20 MG/DL (ref 9–23)
BUN/CREAT SERPL: 24.4 (ref 10–20)
CALCIUM BLD-MCNC: 9.2 MG/DL (ref 8.7–10.4)
CHLORIDE SERPL-SCNC: 108 MMOL/L (ref 98–112)
CO2 SERPL-SCNC: 25 MMOL/L (ref 21–32)
CREAT BLD-MCNC: 0.82 MG/DL (ref 0.55–1.02)
DEPRECATED RDW RBC AUTO: 45.1 FL (ref 35.1–46.3)
EGFRCR SERPLBLD CKD-EPI 2021: 80 ML/MIN/1.73M2 (ref 60–?)
EOSINOPHIL # BLD AUTO: 0.3 X10(3) UL (ref 0–0.7)
EOSINOPHIL NFR BLD AUTO: 6.5 %
ERYTHROCYTE [DISTWIDTH] IN BLOOD BY AUTOMATED COUNT: 12.8 % (ref 11–15)
GLUCOSE BLD-MCNC: 109 MG/DL (ref 70–99)
HCT VFR BLD AUTO: 36.3 % (ref 35–48)
HGB BLD-MCNC: 12.6 G/DL (ref 12–16)
IMM GRANULOCYTES # BLD AUTO: 0.01 X10(3) UL (ref 0–1)
IMM GRANULOCYTES NFR BLD: 0.2 %
LYMPHOCYTES # BLD AUTO: 1.53 X10(3) UL (ref 1–4)
LYMPHOCYTES NFR BLD AUTO: 33 %
MAGNESIUM SERPL-MCNC: 2 MG/DL (ref 1.6–2.6)
MCH RBC QN AUTO: 33 PG (ref 26–34)
MCHC RBC AUTO-ENTMCNC: 34.7 G/DL (ref 31–37)
MCV RBC AUTO: 95 FL (ref 80–100)
MONOCYTES # BLD AUTO: 0.39 X10(3) UL (ref 0.1–1)
MONOCYTES NFR BLD AUTO: 8.4 %
NEUTROPHILS # BLD AUTO: 2.35 X10 (3) UL (ref 1.5–7.7)
NEUTROPHILS # BLD AUTO: 2.35 X10(3) UL (ref 1.5–7.7)
NEUTROPHILS NFR BLD AUTO: 50.8 %
OSMOLALITY SERPL CALC.SUM OF ELEC: 295 MOSM/KG (ref 275–295)
PHOSPHATE SERPL-MCNC: 4.7 MG/DL (ref 2.4–5.1)
PLATELET # BLD AUTO: 154 10(3)UL (ref 150–450)
POTASSIUM SERPL-SCNC: 4.1 MMOL/L (ref 3.5–5.1)
POTASSIUM SERPL-SCNC: 4.1 MMOL/L (ref 3.5–5.1)
RBC # BLD AUTO: 3.82 X10(6)UL (ref 3.8–5.3)
SODIUM SERPL-SCNC: 141 MMOL/L (ref 136–145)
WBC # BLD AUTO: 4.6 X10(3) UL (ref 4–11)

## 2025-04-30 PROCEDURE — 93017 CV STRESS TEST TRACING ONLY: CPT | Performed by: INTERNAL MEDICINE

## 2025-04-30 PROCEDURE — 83735 ASSAY OF MAGNESIUM: CPT | Performed by: HOSPITALIST

## 2025-04-30 PROCEDURE — 84100 ASSAY OF PHOSPHORUS: CPT | Performed by: HOSPITALIST

## 2025-04-30 PROCEDURE — 93016 CV STRESS TEST SUPVJ ONLY: CPT | Performed by: INTERNAL MEDICINE

## 2025-04-30 PROCEDURE — 94760 N-INVAS EAR/PLS OXIMETRY 1: CPT

## 2025-04-30 PROCEDURE — 80048 BASIC METABOLIC PNL TOTAL CA: CPT | Performed by: HOSPITALIST

## 2025-04-30 PROCEDURE — 84132 ASSAY OF SERUM POTASSIUM: CPT | Performed by: HOSPITALIST

## 2025-04-30 PROCEDURE — 85025 COMPLETE CBC W/AUTO DIFF WBC: CPT | Performed by: HOSPITALIST

## 2025-04-30 PROCEDURE — 93018 CV STRESS TEST I&R ONLY: CPT | Performed by: INTERNAL MEDICINE

## 2025-04-30 RX ORDER — METOPROLOL SUCCINATE 25 MG/1
12.5 TABLET, EXTENDED RELEASE ORAL
Qty: 30 TABLET | Refills: 0 | Status: SHIPPED | OUTPATIENT
Start: 2025-05-01

## 2025-04-30 RX ORDER — ADENOSINE 3 MG/ML
INJECTION, SOLUTION INTRAVENOUS
Status: DISCONTINUED
Start: 2025-04-30 | End: 2025-04-30 | Stop reason: WASHOUT

## 2025-04-30 RX ORDER — MAGNESIUM SULFATE HEPTAHYDRATE 40 MG/ML
2 INJECTION, SOLUTION INTRAVENOUS ONCE
Status: COMPLETED | OUTPATIENT
Start: 2025-04-30 | End: 2025-04-30

## 2025-04-30 RX ORDER — VERAPAMIL HYDROCHLORIDE 40 MG/1
40 TABLET ORAL EVERY 8 HOURS SCHEDULED
Qty: 90 TABLET | Refills: 0 | Status: SHIPPED | OUTPATIENT
Start: 2025-04-30

## 2025-04-30 NOTE — PLAN OF CARE
Problem: Patient Centered Care  Goal: Patient preferences are identified and integrated in the patient's plan of care  Description: Interventions:- What would you like us to know as we care for you? Lives at home with dtr  - Provide timely, complete, and accurate information to patient/family- Incorporate patient and family knowledge, values, beliefs, and cultural backgrounds into the planning and delivery of care- Encourage patient/family to participate in care and decision-making at the level they choose- Honor patient and family perspectives and choices  4/30/2025 1729 by Kathy Serrano RN  Outcome: Adequate for Discharge  4/30/2025 0945 by Kathy Serrano RN  Outcome: Progressing     Problem: Patient/Family Goals  Goal: Patient/Family Long Term Goal  Description: Patient's Long Term Goal: to return home  Interventions:  - cardiac monitoring  -EP eval  - See additional Care Plan goals for specific interventions  4/30/2025 1729 by Kathy Serrano RN  Outcome: Adequate for Discharge  4/30/2025 0945 by Kathy Serrano RN  Outcome: Progressing  Goal: Patient/Family Short Term Goal  Description: Patient's Short Term Goal: cardiac stability  Interventions:   - cardiac monitoring  Med adjustement per EP  - See additional Care Plan goals for specific interventions  4/30/2025 1729 by Kathy Serrano RN  Outcome: Adequate for Discharge  4/30/2025 0945 by Kathy Serrano RN  Outcome: Progressing

## 2025-04-30 NOTE — PROGRESS NOTES
[facility] Hospitalist Progress Note     CC: Hospital Follow up    PCP: Eder White MD       Assessment/Plan:     Principal Problem:    Ventricular tachycardia (HCC)        Sherry Torres is a 64 year old female with PMH sig for nonobstructive CAD and mitral prolapse with mild to moderate MR presenting with syncopal versus near syncopal event while at an outpatient doctors appointment.  EMS was called and noted to be in wide-complex tachycardia without improvement with vagal maneuver and defib x 1.  Back in normal sinus rhythm in the ER.  She had a similar event in September 2024 with workup including left heart cath, echo, cardiac MRI.       Syncope versus presyncope with wide-complex tachycardia noted in field, perhaps idopathic VT   Echo reviewed  No reoccurrence on tele   Was defibrillated x 1 in field  Cards and EP following   Now on metoprolol and Verapamil per EP   Off amio   Stress test planned today   Perhaps EP study as outpatient ?  Mag > 2.5, K > 4 - 2 grams mag given this am   NPO this am , pending stress test     Elevated glucose  - A1c is 5.7, monitor     FEN: no IVF, lytes in Am. Cardiac diet     PPX: Heparin subcu     Dispo full code, pending course          Questions/concerns were discussed with patient and/or family by bedside.    Updated sister and daughter at beside     Thank You,  Wiley Meraz,     Hospitalist with [facility]     Subjective:     Feels well, no reoccurrence of symptoms, no chest pains no SOB     OBJECTIVE:    Blood pressure 104/54, pulse 53, temperature (!) 96.4 °F (35.8 °C), temperature source Temporal, resp. rate 16, height 5' 10\" (1.778 m), weight 162 lb 11.2 oz (73.8 kg), SpO2 97%.    Temp:  [96.4 °F (35.8 °C)-97.6 °F (36.4 °C)] 96.4 °F (35.8 °C)  Pulse:  [48-65] 53  Resp:  [14-26] 16  BP: (104-126)/(52-86) 104/54  SpO2:  [94 %-98 %] 97 %      Intake/Output:    Intake/Output Summary (Last 24 hours) at 4/30/2025 0816  Last data filed at  4/29/2025 2100  Gross per 24 hour   Intake 820 ml   Output --   Net 820 ml       Last 3 Weights   04/30/25 0300 162 lb 11.2 oz (73.8 kg)   04/29/25 1359 159 lb 13.3 oz (72.5 kg)   04/29/25 1330 159 lb 13.3 oz (72.5 kg)   04/29/25 0759 157 lb (71.2 kg)   11/03/22 0900 150 lb (68 kg)   10/24/22 1526 150 lb (68 kg)       Exam   Gen: No acute distress, alert and oriented x3  Pulm: Lungs clear, normal respiratory effort  CV: Heart with regular rate and rhythm  Abd: Abdomen soft, nontender, nondistended      Data Review:       Labs:     Recent Labs   Lab 04/29/25  0800 04/30/25  0351   RBC 4.11 3.82   HGB 13.1 12.6   HCT 40.0 36.3   MCV 97.3 95.0   MCH 31.9 33.0   MCHC 32.8 34.7   RDW 12.9 12.8   NEPRELIM 1.99 2.35   WBC 3.9* 4.6   .0 154.0         Recent Labs   Lab 04/29/25  0800 04/30/25  0351   * 109*   BUN 19 20   CREATSERUM 1.06* 0.82   EGFRCR 59* 80   CA 9.0 9.2    141   K 3.7 4.1  4.1    108   CO2 24.0 25.0       No results for input(s): \"ALT\", \"AST\", \"ALB\", \"AMYLASE\", \"LIPASE\", \"LDH\" in the last 168 hours.    Invalid input(s): \"ALPHOS\", \"TBIL\", \"DBIL\", \"TPROT\"      Imaging:  No results found.      Meds:     Scheduled Medications[1]  Medication Infusions[2]  PRN Medications[3]           [1]    magnesium sulfate  2 g Intravenous Once    heparin  5,000 Units Subcutaneous Q8H CANDIDO    metoprolol succinate  25 mg Oral Daily Beta Blocker    verapamil  40 mg Oral Q8H [facility]    aspirin  81 mg Oral Daily    atorvastatin  10 mg Oral Nightly   [2] [3]   acetaminophen    polyethylene glycol (PEG 3350)    sennosides    bisacodyl    ondansetron    prochlorperazine

## 2025-04-30 NOTE — PROGRESS NOTES
[facility]  part of [facility]    Cardiology Progress Note    Sherry Torres Patient Status:  Inpatient    1960 MRN [id number]   Location [facility] 2W/SW Attending Wiley Meraz,    Hosp Day # 1 PCP Eder White MD       Assessment/Plan:  64 year old female presenting with:     1) WCT s/p defib x 1 with restoration of sinus rhythm  2) Syncope/near syncope, 2/2 above  3) Nonobs CAD on [facility] 2024  4) MVP with mild-moderate MR ([facility] 10/2024 with normal EF, no LGE)     - ECG on admission with NSR, no ischemia or arrhythmia; trop negative x 3  - Cardiac testing from similar episode 2024 reviewed; repeat echo this admission with normal LVEF, mild MR, mild-mod TR  - Cont asa, statin, bb  - EP on consult, appreciate recs; amio stopped and patient started on verapamil.  Plan for treadmill study today  - Monitor on tele  - Will follow     Subjective: No events overnight.  Today patient denies chest pain, palpitations, dyspnea.    Problem List[1]    Objective:   Temp: 96.4 °F (35.8 °C)  Pulse: 53  Resp: 16  BP: 104/54    Intake/Output:     Intake/Output Summary (Last 24 hours) at 2025 0936  Last data filed at 2025 2100  Gross per 24 hour   Intake 720 ml   Output --   Net 720 ml       Last 3 Weights   25 0300 162 lb 11.2 oz (73.8 kg)   25 1359 159 lb 13.3 oz (72.5 kg)   25 1330 159 lb 13.3 oz (72.5 kg)   25 0759 157 lb (71.2 kg)   22 0900 150 lb (68 kg)   10/24/22 1526 150 lb (68 kg)       Tele: SB, PVCs    Physical Exam:    General: Alert and oriented x 3. No apparent distress. No respiratory or constitutional distress.  HEENT: Normocephalic, anicteric sclera, neck supple, no thyromegaly or adenopathy.  Neck: No JVD, carotids 2+, no bruits.  Cardiac: Regular rate and rhythm. S1, S2 normal. No murmur, pericardial rub, S3, thrill, heave or extra cardiac sounds.  Lungs: Clear without wheezes, rales, rhonchi or dullness.  Normal excursions and  effort.  Abdomen: Soft, non-tender. No organosplenomegally, mass or rebound, BS-present.  Extremities: Without clubbing, cyanosis or edema.  Peripheral pulses are 2+.  Neurologic: Alert and oriented, normal affect. No motor or coordinational deficit.  Skin: Warm and dry.     Laboratory/Data:    Labs:    Lab Results   Component Value Date    TSH 1.797 04/29/2025       Recent Labs   Lab 04/29/25  0800 04/30/25  0351   WBC 3.9* 4.6   HGB 13.1 12.6   MCV 97.3 95.0   .0 154.0       Recent Labs   Lab 04/29/25  0800 04/30/25  0351    141   K 3.7 4.1  4.1    108   CO2 24.0 25.0   BUN 19 20   CREATSERUM 1.06* 0.82   CA 9.0 9.2   MG 1.9 2.0   PHOS 4.3 4.7   * 109*       No results for input(s): \"ALT\", \"AST\", \"ALB\", \"AMYLASE\", \"LIPASE\", \"LDH\" in the last 168 hours.    Invalid input(s): \"ALPHOS\", \"TBIL\", \"DBIL\", \"TPROT\"    No results for input(s): \"TROP\" in the last 168 hours.    Allergies:   Allergies[2]    Medications:  Current Hospital Medications[3]    Dejuan Wren MD  4/30/2025  9:36 AM         [1]   Patient Active Problem List  Diagnosis    History of basal cell carcinoma    Sun-damaged skin    Inflamed seborrheic keratosis    Neoplasm of uncertain behavior of skin    Benign neoplasm of skin of face    Benign paroxysmal positional vertigo, unspecified laterality    Palpitations    Mitral valve prolapse    PVC's (premature ventricular contractions)    Situational anxiety    Ventricular tachycardia (HCC)   [2] No Known Allergies  [3]   Current Facility-Administered Medications   Medication Dose Route Frequency    magnesium sulfate in sterile water for injection 2 g/50mL IVPB premix 2 g  2 g Intravenous Once    heparin (Porcine) 5000 UNIT/ML injection 5,000 Units  5,000 Units Subcutaneous Q8H CANDIDO    acetaminophen (Tylenol Extra Strength) tab 500 mg  500 mg Oral Q4H PRN    polyethylene glycol (PEG 3350) (Miralax) 17 g oral packet 17 g  17 g Oral Daily PRN    sennosides (Senokot) tab 17.2 mg  17.2 mg  Oral Nightly PRN    bisacodyl (Dulcolax) 10 MG rectal suppository 10 mg  10 mg Rectal Daily PRN    ondansetron (Zofran) 4 MG/2ML injection 4 mg  4 mg Intravenous Q6H PRN    prochlorperazine (Compazine) 10 MG/2ML injection 5 mg  5 mg Intravenous Q8H PRN    metoprolol succinate ER (Toprol XL) 24 hr tab 25 mg  25 mg Oral Daily Beta Blocker    verapamil (Calan) tab 40 mg  40 mg Oral Q8H CANDIDO    aspirin DR tab 81 mg  81 mg Oral Daily    atorvastatin (Lipitor) tab 10 mg  10 mg Oral Nightly

## 2025-04-30 NOTE — PROGRESS NOTES
[facility] Progress Note  Ren Garcia MD  ?  Patient: Sherry Torres 64 year old  MRN: [id number]  Date: 4/30/2025     Primary Care Physician: Dr. Eder White MD  Attending Physician: Justina Matson MD      Patient seen and examined this AM.       ------------------------------------------------------------------    A/P:  Sherry Torres is a 64 year old female with a history of symptomatic wide-complex tachycardia.     Hypertension  -/54 this AM  -Toprol 25 daily along with addition of verapamil 40 tid     Wide-complex tachycardia  -Monomorphic WCT with rate 212 bpm noted with subsequent defibrillation in the field   -ECG on admission with sinus rate of 62 and without high risk features identified.  -troponin 11-->11  -Cardiac monitor 11/24 with sinus average 64 and longest run of NSVT 4 beats in duration (no evidence of sustained ventricular arrhythmias)  -Echo 9/24 with preserved LV systolic function without significant valvular normality  -[facility] reportedly negative (results unavailable for review)  -Cardiac MRI 10/24 with preserved LV systolic function and without evidence of LGE/fibrosis  -Electrolytes stable   -Exercise stress test 4/25 without exercise induced arrhythmia  -currently on toprol and verapmil        Summary recommendations  1.  Continue to monitor on telemetry  2.  Reduce toprol to 12.5 at bedtime  3.  Continue verapamil   4.  No driving  5.  Referral to ED with any recurrent symptoms  6.  Referral to [facility] for consideration of EP study with possible VT ablation    Plan of care discussed with RN.    Thank you for allowing me to participate in the care of your patient.    MD Tracy [facility] Card[facility] Electrophysiology    -----------------------------------------------------------------------------      -------------------------------------------------------------------  Physical Exam:  Blood pressure 104/54, pulse 53, temperature (!) 96.4 °F (35.8 °C),  temperature source Temporal, resp. rate 16, height 5' 10\" (1.778 m), weight 162 lb 11.2 oz (73.8 kg), SpO2 97%.  ?  Alert and Oriented times x 3, pleasant, no distress, conversant, appears stated age  PER, Anicteric sclerae, moist conjunctivae, no eye lid lag present, EOM's intact  Lips, mucosa, and tongue normal; teeth and gums normal,  Trachea midline, no thyromegaly, no cervical lymphadenopathy  No JVD or No carotid bruits  Lungs are CTAB, no wheezes or crackles, normal respiratory effort  Heart exam is regular, no murmurs or S3, PMI is non-displaced  Abdomen is soft, nontender, bowel sounds are present, no HSM, no bruits  Lower extremities without edema, no clubbing  1+ pedal and femoral pulses bilaterally  Normal skin turgor, texture, no rashes or lesions?      LABS:  .  Lab Results   Component Value Date    WBC 4.6 04/30/2025    HGB 12.6 04/30/2025    HCT 36.3 04/30/2025    MCV 95.0 04/30/2025    .0 04/30/2025     .  Lab Results   Component Value Date    BUN 20 04/30/2025     04/30/2025    K 4.1 04/30/2025    K 4.1 04/30/2025     04/30/2025    CO2 25.0 04/30/2025     .No results found for: \"INR\", \"PROTIME\"  .No components found for: \"CHLPL\"  Lab Results   Component Value Date    HDL 48 11/03/2022     Lab Results   Component Value Date     (A) 11/03/2022     Lab Results   Component Value Date    TRIG 196 (A) 11/03/2022     No results found for: \"CHOLHDL\"  .No results found for: \"ALT\", \"AST\", \"GGT\", \"ALKPHOS\", \"BILITOT\"  .  Lab Results   Component Value Date    TSH 1.797 04/29/2025         Ren Garcia MD  [facility]

## 2025-04-30 NOTE — PLAN OF CARE
Problem: Patient Centered Care  Goal: Patient preferences are identified and integrated in the patient's plan of care  Description: Interventions:- What would you like us to know as we care for you? Lives at home with dtr  - Provide timely, complete, and accurate information to patient/family- Incorporate patient and family knowledge, values, beliefs, and cultural backgrounds into the planning and delivery of care- Encourage patient/family to participate in care and decision-making at the level they choose- Honor patient and family perspectives and choices  Outcome: Progressing     Problem: Patient/Family Goals  Goal: Patient/Family Long Term Goal  Description: Patient's Long Term Goal: to return home  Interventions:  - cardiac monitoring  -EP eval  - See additional Care Plan goals for specific interventions  Outcome: Progressing  Goal: Patient/Family Short Term Goal  Description: Patient's Short Term Goal: cardiac stability  Interventions:   - cardiac monitoring  Med adjustement per EP  - See additional Care Plan goals for specific interventions  Outcome: Progressing     Problem: CARDIOVASCULAR - ADULT  Goal: Maintains optimal cardiac output and hemodynamic stability  Description: INTERVENTIONS:- Monitor vital signs, rhythm, and trends- Monitor for bleeding, hypotension and signs of decreased cardiac output- Evaluate effectiveness of vasoactive medications to optimize hemodynamic stability- Monitor arterial and/or venous puncture sites for bleeding and/or hematoma- Assess quality of pulses, skin color and temperature- Assess for signs of decreased coronary artery perfusion - ex. Angina- Evaluate fluid balance, assess for edema, trend weights  Outcome: Progressing  Goal: Absence of cardiac arrhythmias or at baseline  Description: INTERVENTIONS:- Continuous cardiac monitoring, monitor vital signs, obtain 12 lead EKG if indicated- Evaluate effectiveness of antiarrhythmic and heart rate control medications as  ordered- Initiate emergency measures for life threatening arrhythmias- Monitor electrolytes and administer replacement therapy as ordered  Outcome: Progressing

## 2025-05-01 LAB
% OF MAX PREDICTED HR: 100 %
MAX DIASTOLIC BP: 60 MMHG
MAX HEART RATE: 144 BPM
MAX PREDICTED HEART RATE: 156 BPM
MAX SYSTOLIC BP: 165 MMHG
MAX WORK LOAD: 101

## 2025-05-01 NOTE — DISCHARGE SUMMARY
General Medicine Discharge Summary     Patient ID:  Sherry Torres  64 year old  5/22/1960    Admit date: 4/29/2025    Discharge date and time: 4/30/2025  5:50 PM     Attending Physician: Wiley Meraz DO     Consults: IP CONSULT TO CARDIOLOGY    Primary Care Physician: Eder White MD     Reason for admission: Admitted for syncope found to have wide-complex tachycardia    Risk For Readmission: Low    Discharge Diagnoses: Ventricular tachycardia (HCC) [I47.20]  See Additional Discharge Diagnoses in Hospital Course    Discharged Condition: good    Follow-up with labs/images appointments:   Close follow-up with EP as an outpatient for EP study and possible ablation  Close follow-up with primary care provider in 2 to 4-week    Exam  Gen: No acute distress  Pulm: Lungs clear, normal respiratory effort  CV: Heart with regular rate and rhythm  Abd: Abdomen soft,   EXT: no edema     HPI:   Per Dr. French  Sherry Torres is a 64 year old female with PMH sig for nonobstructive CAD and mitral prolapse with mild to moderate MR presenting with syncopal versus near syncopal event while at an outpatient doctors appointment.  EMS was called and noted to be in wide-complex tachycardia without improvement with vagal maneuver and defib x 1.  Back in normal sinus rhythm in the ER.  She had a similar event in September 2024 with workup including left heart cath, echo, cardiac MRI.      Hospital Course:   Patient was admitted for syncope found to have wide-complex tachycardia status post 1 defibrillation in the field seen by cardiology and EP for possible paroxysmal ventricular tachycardia patient underwent a stress test which was normal EP recommended outpatient EP follow-up for possible EP study and ablation patient also discharged on verapamil, we told her to stop taking her losartan and the dose of metoprolol was changed.     Operative Procedures:      Imaging: No results found.    Disposition: home    Activity: activity  as tolerated  Diet: regular diet  Wound Care: none needed  Code Status: Full Code  O2: none    Home Medication Changes: See list below    Med list     Medication List        START taking these medications      verapamil 40 MG Tabs  Commonly known as: Calan  Take 1 tablet (40 mg total) by mouth every 8 (eight) hours.            CHANGE how you take these medications      metoprolol succinate ER 25 MG Tb24  Commonly known as: Toprol XL  Take 0.5 tablets (12.5 mg total) by mouth Daily Beta Blocker.  What changed:   how much to take  when to take this            CONTINUE taking these medications      aspirin 81 MG Tbec     atorvastatin 10 MG Tabs  Commonly known as: Lipitor     CALCIUM + D OR     FISH OIL BURP-LESS OR     Magnesium Glycinate 120 MG Caps     Multi Vitamin Daily Tabs            STOP taking these medications      losartan 25 MG Tabs  Commonly known as: Cozaar               Where to Get Your Medications        These medications were sent to [id number] - [place], [place] - [address] AT [facility], [phone], [phone] [address], [facility] [address]      Phone: [phone]   metoprolol succinate ER 25 MG Tb24  verapamil 40 MG Tabs         FU   Follow-up Information       Eder White MD Follow up in 2 week(s).    Specialty: Internal Medicine  Contact information:  [address]  [address]  [facility] [address] [phone]               Ren Garcia MD Follow up in 2 week(s).    Specialty: Clinical Cardiac Electrophysiology  Why: To discuss ablation  Contact information:  [address]  [facility] [address] [phone]                             DC instructions:      I reconciled current and discharge medications on day of discharge, discussed changes with patient and noted changes above.       Total Time Coordinating Care: 35 minutes    Patient had opportunity to ask questions and state understand and agree with  therapeutic plan as outlined    Thank You,    Wiley Meraz, DO   Hospitalist with [facility]

## 2025-05-01 NOTE — PAYOR COMM NOTE
--------------  ADMISSION REVIEW     Payor: [employer]UL  Subscriber #:  [id number]  Authorization Number: [id number]    Admit date: 4/29/25  Admit time:  1:40 PM       REVIEW DOCUMENTATION:    ED Provider Notes signed by Wiley Jensen MD at 4/29/2025  8:59 AM      Patient Seen in: [facility] Emergency Department      History     Chief Complaint   Patient presents with    Arrythmia/Palpitations     Stated Complaint: vtach    Subjective:   64-year-old female brought in by EMS due to arrhythmia.  She states that she woke up this morning she was in her normal state of health, she is getting ready go to a gynecologist appointment as well palpitations lightheadedness.  Per daughters at bedside she was poorly responsive, was slumped over in a chair.  On EMS arrival patient monomorphic tachycardia which appeared wide-complex with low blood pressure.  Was cardioverted at 100 J into sinus rhythm.  Upon arrival to the ER she stable symptoms have resolved.  She did recall some chest tightness during the event.  She states had a similar episode when she was in [place] 2024, by the time she got to the hospital the palpitations had resolved.  She had workup that was significant for minimally elevated troponin, and so she was transferred to other hospital in [place] where she underwent cardiac workup including an angiogram which showed nonobstructive disease.  She subsequently followed up with cardiology was had cardiac MRI.  She is on beta-blocker and statin.  There is no family history of arrhythmia or sudden cardiac death there is a family history of scad.     History of Present Illness             Objective:     Past Medical History:    Basal cell cancer    chest    Skin cancer     Physical Exam     ED Triage Vitals   BP 04/29/25 0759 140/72   Pulse 04/29/25 0758 74   Resp 04/29/25 0758 19   Temp --    Temp src --    SpO2 04/29/25 0759 95 %   O2 Device 04/29/25 0759 Simple mask       Current Vitals:    Vital Signs  BP: 107/72  Pulse: 60  Resp: 22  MAP (mmHg): 79    Oxygen Therapy  SpO2: 96 %  O2 Device: None (Room air)        Physical Exam  Constitutional: awake, alert, no sig distress  HENT: mmm, no lesions,  Neck: normal range of motion, no tenderness, supple.  Eyes: PERRL, EOMI, conjunctiva normal, no discharge. Sclera anicteric.  Cardiovascular: rr no murmur  Respiratory: Normal breath sounds, no respiratory distress, no wheezing, no chest tenderness.  GI: Bowel sounds normal, Soft, no tenderness, no masses, no pulsatile masses.  : No CVA tenderness.  Skin: Warm, dry, no erythema, no rash.  Musculoskeletal: Intact distal pulses, no edema, no tenderness, no cyanosis, no clubbing. Good range of motion in all major joints. No tenderness to palpation or major deformities noted. Back- No tenderness.  Neurologic: Alert & oriented x 3, normal motor function, normal sensory function, no focal deficits noted.  Psych: Calm, cooperative, nl affect      Physical Exam        ED Course     Labs Reviewed   BASIC METABOLIC PANEL (8) - Abnormal; Notable for the following components:       Result Value    Glucose 275 (*)     Creatinine 1.06 (*)     Calculated Osmolality 304 (*)     eGFR-Cr 59 (*)     All other components within normal limits   MAGNESIUM - Normal   PHOSPHORUS - Normal   TROPONIN I HIGH SENSITIVITY - Normal   CBC WITH DIFFERENTIAL WITH PLATELET   RAINBOW DRAW LAVENDER   RAINBOW DRAW LIGHT GREEN   RAINBOW DRAW BLUE   RAINBOW DRAW GOLD     EKG    Rate, intervals and axes as noted on EKG Report.  Rate: 62  Rhythm: Sinus Rhythm  Reading: NSR, normal axis/intervals, no st or t wave changes, no stemi            Results            Cardiac monitor interpreted by me which shows sinus rhythm with PVCs.       MDM      64-year-old female history as above presenting with narrow complex tachycardia associated with hemodynamic instability, addressed in prehospital setting via cardioversion at 100 J.  On arrival vital stable  reassuring.  Patient asymptomatic with sinus rhythm on the monitor.  Reviewed EMS strips, but does appear to show wide-complex tachycardia -VT versus SVT with aberrancy.   Plan for cardiology consultation, admission.  Will start amiodarone.  Discussed with hospitalist and cardiology.    Admission disposition: 4/29/2025  8:34 AM      Disposition and Plan     Clinical Impression:  1. Ventricular tachycardia (HCC)         Disposition:  Admit  4/29/2025  8:34 am    Hospital Problems       Present on Admission  Date Reviewed: 10/24/2022          ICD-10-CM Noted POA    Ventricular tachycardia (HCC) I47.20 4/29/2025 Unknown               4/29 H&P       Sherry Torres is a 64 year old female with PMH sig for nonobstructive CAD and mitral prolapse with mild to moderate MR presenting with syncopal versus near syncopal event while at an outpatient doctors appointment.  EMS was called and noted to be in wide-complex tachycardia without improvement with vagal maneuver and defib x 1.  Back in normal sinus rhythm in the ER.  She had a similar event in September 2024 with workup including left heart cath, echo, cardiac MRI.       Syncope versus presyncope with wide-complex tachycardia noted in field  - Did not improve with vagal maneuver and defibrillated x 1  - Back in normal sinus rhythm in the ER and has maintained  - [place] workup including left heart cath (nonobstructive disease), echo and cardiac MRI  - Plan for EP evaluation on 4/30/2025 per discussion with cardiology, echo completed, follow-up report     Elevated glucose  - A1c is 5.7, monitor     FEN: no IVF, lytes in Am. Cardiac diet     PPX: Heparin subcu     Dispo full code, pending course       Sherry Torres is a 64 year old female with PMH sig for nonobstructive CAD and mitral prolapse with mild to moderate MR presenting with syncopal versus near syncopal event while at her daughter's apartment getting ready  a doctor's appointment (annual gyne).  EMS was called and noted to be in wide-complex tachycardia without improvement with vagal maneuver and defib x 1.  Back in normal sinus rhythm in the ER.  She had a similar event in September 2024 with workup including left heart cath, echo, cardiac MRI.       Now back in normal sinus rhythm.  No hypoxia noted..  Amiodarone was started in the ER but now stopped.  Metoprolol resumed along with verapamil.  Rate currently in the 50s to 60s range.  98% on room air afebrile.     Discussed with daughter.  She states she closed her eyes and put her head back and was unresponsive for about 2 minutes.  Felt like her body was limp.  Did not fall to the ground.  Then she was more responsive but daughter feels she does not really remember the few minutes after that.  Then returned back to her normal self.  Does not think that she saw EP during prior workup.          4/29 EP Cardiology  Reason for Consultation: Ventricular tachycardia     HPI:  Sherry Torres is a 64-year-old female with a history of suspected prior VTE who presented to the emergency room earlier today following an intense palpitations.  EMS was summoned and patient was found to have a monomorphic wide-complex tachycardia with hypotension and subsequently defibrillated in the field.     In the emergency room, blood pressure was 140/72 with a heart rate of 74.  Saturations were stable on room air.  ECG showed sinus rhythm with rate of 62.  No high risk features were identified.    Initial troponin was negative.  Potassium was 3.7 and magnesium 1.9 with creatinine 1.06.     The patient is seen in the emergency room.  She states that she had woken up in her usual state of health when all of a sudden she developed intense palpitations and a feeling as though she would pass out.  She checked her heart rate on her personal monitor and noted to be in excess of 200 bpm.  EMS was summoned and at that point, she was found to have a  monomorphic wide-complex tachycardia with rate of 212 bpm with subsequent external defibrillation.  The patient states that while she felt near syncopal, she did not lose consciousness and can recall experiencing the shock.       She does recall having somewhat of a similar symptomatic episode back in 9/24.  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  She did seek medical attention upon reaching sure however symptoms had mostly subsided by then.     She does not smoke but does drink alcohol socially.  Family history is notable for sister who had a spontaneous carotid artery dissection.  She is currently employed and works for [employer] in the [employer].    A/P:  Sherry Torres is a 60-year-old female with a history of symptomatic wide-complex tachycardia.     Hypertension  -Lisinopril 2.5 and Toprol 25     Wide-complex tachycardia  -Monomorphic WCT with rate 212 bpm noted with subsequent defibrillation in the field   -ECG on admission with sinus rate of 62 and without high risk features identified.  -Cardiac monitor 11/24 with sinus average 64 and longest run of NSVT 4 beats in duration (no evidence of sustained ventricular arrhythmias)  -Echo 9/24 with preserved LV systolic function without significant valvular normality  -LHC reportedly negative (results unavailable for review)  -Cardiac MRI 10/24 with preserved LV systolic function and without evidence of LGE/fibrosis  -Electrolytes stable   -currently on amiodarone     To summarize, the patient has previously undergone cardiac workup including cardiac catheterization, echocardiogram, and cardiac MRI all of which were relatively unrevealing.  The patient may potentially have idiopathic VT and otherwise structurally normal heart.  Outflow tract VT may be a possibility however diagnosis would be challenging given single-lead tracing available for review.       Summary recommendations  1.  Continue to monitor on telemetry  2.  Discontinue amiodarone  3.  Continue Toprol 25 daily  4.  Add verapamil 40 tid (can hold lisinopril)  5.  Consider exercise treadmill study for evaluation of any exercise-induced arrhythmias  6.  ICD may be indicated however will need further workup          4/29 Cardiology  Assessment/Plan:  64 year old female presenting with:     1) WCT s/p defib x 1 with restoration of sinus rhythm  2) Syncope/near syncope, 2/2 above  3) Nonobs CAD on [facility] 9/2024  4) MVP with mild-moderate MR ([facility]RI 10/2024 with normal EF, no LGE)     - ECG on admission with NSR, no ischemia or arrhythmia; trop negative x 2  - Cardiac testing from similar episode 9/2024 reviewed; repeat echo ordered and pending  - Cont IV amiodarone for now  - Cont asa, statin, bb, ace-I  - EP eval  - Monitor on tele  - Will follow          4/30 Cardiology  Hypertension  -/54 this AM  -Toprol 25 daily along with addition of verapamil 40 tid     Wide-complex tachycardia  -Monomorphic WCT with rate 212 bpm noted with subsequent defibrillation in the field   -ECG on admission with sinus rate of 62 and without high risk features identified.  -troponin 11-->11  -Cardiac monitor 11/24 with sinus average 64 and longest run of NSVT 4 beats in duration (no evidence of sustained ventricular arrhythmias)  -Echo 9/24 with preserved LV systolic function without significant valvular normality  -[facility] reportedly negative (results unavailable for review)  -Cardiac MRI 10/24 with preserved LV systolic function and without evidence of LGE/fibrosis  -Electrolytes stable   -Exercise stress test 4/25 without exercise induced arrhythmia  -currently on toprol and verapmil        Summary recommendations  1.  Continue to monitor on telemetry  2.  Reduce toprol to 12.5 at bedtime  3.  Continue verapamil   4.  No driving  5.  Referral to ED with any recurrent symptoms  6.  Referral to [facility] for consideration of EP study  with possible VT ablation          4/30 IM      Sherrykain Torres is a 64 year old female with PMH sig for nonobstructive CAD and mitral prolapse with mild to moderate MR presenting with syncopal versus near syncopal event while at an outpatient doctors appointment.  EMS was called and noted to be in wide-complex tachycardia without improvement with vagal maneuver and defib x 1.  Back in normal sinus rhythm in the ER.  She had a similar event in September 2024 with workup including left heart cath, echo, cardiac MRI.       Syncope versus presyncope with wide-complex tachycardia noted in field, perhaps idopathic VT   Echo reviewed  No reoccurrence on tele   Was defibrillated x 1 in field  Cards and EP following   Now on metoprolol and Verapamil per EP   Off amio   Stress test planned today   Perhaps EP study as outpatient ?  Mag > 2.5, K > 4 - 2 grams mag given this am   NPO this am , pending stress test      Elevated glucose  - A1c is 5.7, monitor     FEN: no IVF, lytes in Am. Cardiac diet     PPX: Heparin subcu     Dispo full code, pending course        4/30 Cardiology    1) WCT s/p defib x 1 with restoration of sinus rhythm  2) Syncope/near syncope, 2/2 above  3) Nonobs CAD on [facility] 9/2024  4) MVP with mild-moderate MR ([facility]RI 10/2024 with normal EF, no LGE)     - ECG on admission with NSR, no ischemia or arrhythmia; trop negative x 3  - Cardiac testing from similar episode 9/2024 reviewed; repeat echo this admission with normal LVEF, mild MR, mild-mod TR  - Cont asa, statin, bb  - EP on consult, appreciate recs; amio stopped and patient started on verapamil.  Plan for treadmill study today  - Monitor on tele  - Will follow          MEDICATIONS ADMINISTERED IN LAST 1 DAY:  aspirin DR tab 81 mg       Date Action Dose Route User    Discharged on 4/30/2025 4/30/2025 0844 Given 81 mg Oral Kathy Serrano, RN          heparin (Porcine) 5000 UNIT/ML injection 5,000 Units       Date Action Dose Route User    Discharged on  4/30/2025 4/30/2025 1504 Given 5,000 Units Subcutaneous (Left Lower Abdomen) Kathy Serrano, RN          magnesium sulfate in sterile water for injection 2 g/50mL IVPB premix 2 g       Date Action Dose Route User    Discharged on 4/30/2025 4/30/2025 0849 New Bag 2 g Intravenous Kathy Serrano RN          verapamil (Calan) tab 40 mg       Date Action Dose Route User    Discharged on 4/30/2025 4/30/2025 1504 Given 40 mg Oral Kathy Serrano RN            Vitals (last day) before discharge       Date/Time Temp Pulse Resp BP SpO2 Weight O2 Device O2 Flow Rate (L/min) [facility]    04/30/25 1700 -- 49 15 111/62 95 % -- None (Room air) -- MW    04/30/25 1500 -- 62 17 117/69 98 % -- None (Room air) -- MW    04/30/25 1400 -- 58 16 110/45 95 % -- None (Room air) -- MW    04/30/25 1100 -- 48 17 105/64 96 % -- None (Room air) -- MW    04/30/25 1000 -- 49 18 109/71 95 % -- None (Room air) -- MW    04/30/25 0900 -- 56 20 113/75 95 % -- None (Room air) -- MW    04/30/25 0800 98.7 °F (37.1 °C) 55 15 128/115 97 % -- None (Room air) -- MW    04/30/25 0700 -- 49 18 117/72 96 % -- None (Room air) -- MW    04/30/25 0600 -- -- -- -- 97 % -- None (Room air) -- KR    04/30/25 0500 -- 53 16 104/54 96 % -- None (Room air) -- KR    04/30/25 0400 96.4 °F (35.8 °C) 48 18 107/62 95 % -- None (Room air) -- KR    04/30/25 0300 -- 50 18 108/64 97 % 162 lb 11.2 oz (73.8 kg) None (Room air) -- KR    04/30/25 0200 -- 49 16 111/69 97 % -- None (Room air) -- KR    04/30/25 0100 -- 52 19 104/63 96 % -- None (Room air) -- KR    04/30/25 0000 97.4 °F (36.3 °C) 54 18 108/65 95 % -- None (Room air) -- KR    04/29/25 2300 -- 52 15 107/58 94 % -- None (Room air) -- KR    04/29/25 2200 -- 55 20 117/71 96 % -- None (Room air) -- KR    04/29/25 2100 -- 63 21 111/66 95 % -- None (Room air) -- KR    04/29/25 2000 97.2 °F (36.2 °C) 56 21 104/78 97 % -- None (Room air) -- KR    04/29/25 1800 -- 63 26 111/74 96 % -- None (Room air) -- KB    04/29/25 1700 -- 64 18  112/59 97 % -- None (Room air) --     04/29/25 1600 97.6 °F (36.4 °C) 55 19 112/71 96 % -- None (Room air) --     04/29/25 1500 -- 50 14 119/73 98 % -- None (Room air) --     04/29/25 1400 -- 52 17 113/67 98 % -- None (Room air) --     04/29/25 1359 -- -- -- -- -- 159 lb 13.3 oz (72.5 kg) -- --     04/29/25 1330 97.5 °F (36.4 °C) 62 14 126/71 97 % 159 lb 13.3 oz (72.5 kg) None (Room air) --     04/29/25 1230 -- 60 15 113/77 96 % -- None (Room air) --     04/29/25 1145 -- 55 21 113/52 97 % -- None (Room air) --     04/29/25 1115 -- 56 17 113/74 95 % -- None (Room air) --     04/29/25 1045 -- 55 16 105/80 96 % -- None (Room air) --     04/29/25 1030 -- 55 17 125/78 95 % -- None (Room air) --     04/29/25 1015 -- 57 16 117/86 98 % -- None (Room air) --     04/29/25 1000 -- 52 15 113/69 97 % -- None (Room air) --     04/29/25 0923 -- 57 22 125/79 97 % -- -- --     04/29/25 0908 -- 65 21 116/81 98 % -- None (Room air) --     04/29/25 0906 -- -- -- 116/81 -- -- -- --     04/29/25 0853 -- 60 21 107/72 96 % -- -- --     04/29/25 0848 -- -- -- 107/72 -- -- -- --     04/29/25 0838 -- 62 17 112/72 97 % -- -- --     04/29/25 0813 -- 60 22 123/74 96 % -- -- --     04/29/25 0809 -- -- -- -- -- -- None (Room air) --     04/29/25 0759 -- -- -- 140/72 95 % 157 lb (71.2 kg) Simple mask --     04/29/25 0758 -- 74 19 -- -- -- -- -- ZG         --------------  DISCHARGE REVIEW    Payor: [facility] PPO  Subscriber #:  [id number]  Authorization Number: [id number]    Admit date: 4/29/25  Admit time:   1:40 PM  Discharge Date: 4/30/2025  5:50 PM     Admitting Physician: Justina French MD  Attending Physician:  No att. providers found  Primary Care Physician: Eder White MD        REVIEWER COMMENTS

## 2025-07-01 RX ORDER — METOPROLOL SUCCINATE 25 MG/1
25 TABLET, EXTENDED RELEASE ORAL 2 TIMES DAILY
Qty: 180 TABLET | Refills: 3 | Status: SHIPPED | OUTPATIENT
Start: 2025-07-01

## (undated) NOTE — MR AVS SNAPSHOT
[facility] SPECIALTY [facility] [place] [address] [place]  [id number] [phone]               Thank you for choosing us for your health care visit with Elise Dean MD.  We are glad to serve you and happy to provide you with this summary of y Return in about 1 year (around 2/6/2018). [employer]     Sign up for [employer], your secure online medical record. [employer] will allow you to access patient instructions from your recent visit,  view other health information, and more.  To sign up or fi